# Patient Record
Sex: MALE | Race: WHITE | URBAN - METROPOLITAN AREA
[De-identification: names, ages, dates, MRNs, and addresses within clinical notes are randomized per-mention and may not be internally consistent; named-entity substitution may affect disease eponyms.]

---

## 2020-10-16 LAB — HBA1C MFR BLD HPLC: 10 %

## 2020-12-14 ENCOUNTER — VIRTUAL VISIT (OUTPATIENT)
Dept: ENDOCRINOLOGY | Age: 55
End: 2020-12-14
Payer: COMMERCIAL

## 2020-12-14 DIAGNOSIS — E11.65 TYPE 2 DIABETES MELLITUS WITH HYPERGLYCEMIA, WITH LONG-TERM CURRENT USE OF INSULIN (HCC): Primary | ICD-10-CM

## 2020-12-14 DIAGNOSIS — Z79.4 TYPE 2 DIABETES MELLITUS WITH HYPERGLYCEMIA, WITH LONG-TERM CURRENT USE OF INSULIN (HCC): Primary | ICD-10-CM

## 2020-12-14 PROCEDURE — 99203 OFFICE O/P NEW LOW 30 MIN: CPT | Performed by: INTERNAL MEDICINE

## 2020-12-14 RX ORDER — LANOLIN ALCOHOL/MO/W.PET/CERES
400 CREAM (GRAM) TOPICAL DAILY
COMMUNITY

## 2020-12-14 RX ORDER — PHENOL/SODIUM PHENOLATE
1 AEROSOL, SPRAY (ML) MUCOUS MEMBRANE DAILY
COMMUNITY
End: 2022-04-11 | Stop reason: SDUPTHER

## 2020-12-14 RX ORDER — LISINOPRIL 20 MG/1
20 TABLET ORAL DAILY
COMMUNITY
Start: 2020-07-14 | End: 2021-06-02

## 2020-12-14 RX ORDER — METFORMIN HYDROCHLORIDE 1000 MG/1
TABLET ORAL
COMMUNITY
Start: 2020-08-26 | End: 2021-05-18

## 2020-12-14 RX ORDER — INSULIN LISPRO 100 [IU]/ML
INJECTION, SOLUTION INTRAVENOUS; SUBCUTANEOUS
COMMUNITY
Start: 2020-10-16 | End: 2021-06-29

## 2020-12-14 RX ORDER — SIMVASTATIN 40 MG/1
40 TABLET, FILM COATED ORAL DAILY
COMMUNITY
Start: 2020-08-26 | End: 2021-05-18

## 2020-12-14 RX ORDER — LORATADINE 10 MG/1
10 TABLET ORAL DAILY
COMMUNITY

## 2020-12-14 RX ORDER — SERTRALINE HYDROCHLORIDE 100 MG/1
TABLET, FILM COATED ORAL
COMMUNITY
Start: 2020-09-22 | End: 2021-02-25 | Stop reason: DRUGHIGH

## 2020-12-14 RX ORDER — LEVOTHYROXINE SODIUM 25 UG/1
1 TABLET ORAL
COMMUNITY
Start: 2020-11-28 | End: 2021-05-18

## 2020-12-14 RX ORDER — GLIPIZIDE 10 MG/1
TABLET ORAL
COMMUNITY
Start: 2020-09-22 | End: 2021-03-19

## 2020-12-14 RX ORDER — TRAZODONE HYDROCHLORIDE 50 MG/1
TABLET ORAL
COMMUNITY
Start: 2020-09-22 | End: 2021-03-19

## 2020-12-14 NOTE — PROGRESS NOTES
This is a new pt visit conducted via telemedicine using [employer] video. The patient has been instructed that this meets HIPAA criteria ,that they may receive a bill for these services and acknowledges and agrees to this method of visitation. This is a 53-year-old white male with a history of type 2 diabetes mellitus diagnosed in 1999. Strong family history of diabetes. His father and his paternal grandfather have or had diabetes. He was found to have diabetes on a routine physical.  He was placed on metformin and glipizide and over the last 20 years the regimen has been changed to add insulin. He says his most recent A1c was 10.1%. The diabetes is complicated by retinopathy and is currently receiving injections into the right. Current Diabetes Medication  Metformin 1000 BID  Glipizide 10 mg BID  70/30 insulin 50 AM and 35 units PM  Humalog 5 units with lunch    He says he wakes up in the morning with blood sugars of about 130. This apparently is an improvement. He says his blood sugars at bedtime are in the 1  range. He works as a  and lives with his mother. Gonzalokendrick Cogan He is working from home. Breakfast is cereal or eggs and toast and grape juice and/or milk. Lunch is typically a sandwich or a [place] taco salad. He drinks a diet Pepsi. Dinner can be a [place] salad or hamburger or shrimp and broccoli or stirfry. Bedtime is usually a rare cookie or cupcake. His exercise is minimal.  He occasionally walks for 20 to 60 minutes. He said he sleeps poorly. He has nocturia x1.    Review of Systems - General ROS: negative  Psychological ROS: negative  Ophthalmic ROS: positive for - blurry vision and retinopathy in the right  ENT ROS: negative  Respiratory ROS: no cough, shortness of breath, or wheezing  Cardiovascular ROS: no chest pain or dyspnea on exertion  Gastrointestinal ROS: no abdominal pain, change in bowel habits, or black or bloody stools  Genito-Urinary ROS: no dysuria, trouble voiding, or hematuria  Musculoskeletal ROS: negative  Neurological ROS: no TIA or stroke symptoms  Dermatological ROS: negative    GENERAL: NCAT, Appears well nourished  Weight 314 pounds  Height 6 feet 1 inch  BMI 41 kg/m²  EYES: EOMI, non-icteric, no proptosis   Ear/Nose/Throat: NCAT, no visible inflammation or masses   CARDIOVASCULAR: no cyanosis, no visible JVD   RESPIRATORY: comfortable respirations observed, no cyanosis   MUSCULOSKELETAL: Normal ROM of upper extremities observed   SKIN: No edema, rash, or other significant changes observed   NEUROLOGIC:  AAOx3   PSYCHIATRIC: Normal affect, Normal insight and judgement       Impression type 2 diabetes mellitus and morbid obesity with a recent A1c of 10.1% on combination therapy. Plan:  1. We will continue the current regimen for now  2. After considerable discussion we elected to begin Ozempic 0.25 mg to be advanced as tolerated  3. He will continue to monitor his blood sugars and send me the report. 4.  I will see him back in 3 months, hopefully face-to-face.     ADDENDUM: received recent labwork from PCP October 2020  Weight 318  HbA1c 10.0  TSH 3.2  Creat 0.6  Glucose 372

## 2021-02-25 ENCOUNTER — OFFICE VISIT (OUTPATIENT)
Dept: INTERNAL MEDICINE CLINIC | Age: 56
End: 2021-02-25
Payer: COMMERCIAL

## 2021-02-25 VITALS
HEIGHT: 73 IN | SYSTOLIC BLOOD PRESSURE: 130 MMHG | DIASTOLIC BLOOD PRESSURE: 72 MMHG | TEMPERATURE: 97.8 F | BODY MASS INDEX: 41.75 KG/M2 | WEIGHT: 315 LBS | HEART RATE: 96 BPM | RESPIRATION RATE: 18 BRPM | OXYGEN SATURATION: 97 %

## 2021-02-25 DIAGNOSIS — N40.1 BENIGN PROSTATIC HYPERPLASIA WITH URINARY HESITANCY: ICD-10-CM

## 2021-02-25 DIAGNOSIS — N62 GYNECOMASTIA, MALE: ICD-10-CM

## 2021-02-25 DIAGNOSIS — E66.01 MORBID OBESITY WITH BMI OF 40.0-44.9, ADULT (HCC): ICD-10-CM

## 2021-02-25 DIAGNOSIS — E78.00 HYPERCHOLESTEROLEMIA: ICD-10-CM

## 2021-02-25 DIAGNOSIS — Z11.59 NEED FOR HEPATITIS C SCREENING TEST: ICD-10-CM

## 2021-02-25 DIAGNOSIS — F33.1 MODERATE EPISODE OF RECURRENT MAJOR DEPRESSIVE DISORDER (HCC): ICD-10-CM

## 2021-02-25 DIAGNOSIS — I10 ESSENTIAL HYPERTENSION: ICD-10-CM

## 2021-02-25 DIAGNOSIS — E55.9 VITAMIN D DEFICIENCY: ICD-10-CM

## 2021-02-25 DIAGNOSIS — Z00.00 WELLNESS EXAMINATION: ICD-10-CM

## 2021-02-25 DIAGNOSIS — Z79.4 TYPE 2 DIABETES MELLITUS WITH RETINOPATHY WITHOUT MACULAR EDEMA, WITH LONG-TERM CURRENT USE OF INSULIN, UNSPECIFIED LATERALITY, UNSPECIFIED RETINOPATHY SEVERITY (HCC): Primary | ICD-10-CM

## 2021-02-25 DIAGNOSIS — E03.9 ACQUIRED HYPOTHYROIDISM: ICD-10-CM

## 2021-02-25 DIAGNOSIS — R39.11 BENIGN PROSTATIC HYPERPLASIA WITH URINARY HESITANCY: ICD-10-CM

## 2021-02-25 DIAGNOSIS — E11.319 TYPE 2 DIABETES MELLITUS WITH RETINOPATHY WITHOUT MACULAR EDEMA, WITH LONG-TERM CURRENT USE OF INSULIN, UNSPECIFIED LATERALITY, UNSPECIFIED RETINOPATHY SEVERITY (HCC): Primary | ICD-10-CM

## 2021-02-25 DIAGNOSIS — E29.1 HYPOGONADISM IN MALE: ICD-10-CM

## 2021-02-25 DIAGNOSIS — Z12.11 COLON CANCER SCREENING: ICD-10-CM

## 2021-02-25 LAB
25(OH)D3 SERPL-MCNC: 40 NG/ML (ref 30–96)
A-G RATIO,AGRAT: 2 RATIO
ALBUMIN SERPL-MCNC: 4.5 G/DL (ref 3.9–5.4)
ALP SERPL-CCNC: 116 U/L (ref 38–126)
ALT SERPL-CCNC: 31 U/L (ref 0–50)
ANION GAP SERPL CALC-SCNC: 15 MMOL/L
AST SERPL W P-5'-P-CCNC: 23 U/L (ref 14–36)
BILIRUB SERPL-MCNC: 0.4 MG/DL (ref 0.2–1.3)
BILIRUB UR QL: NEGATIVE
BUN SERPL-MCNC: 21 MG/DL (ref 9–20)
BUN/CREATININE RATIO,BUCR: 30 RATIO
CALCIUM SERPL-MCNC: 10.2 MG/DL (ref 8.4–10.2)
CHLORIDE SERPL-SCNC: 101 MMOL/L (ref 98–107)
CHOL/HDL RATIO,CHHD: 4 RATIO (ref 0–4)
CHOLEST SERPL-MCNC: 154 MG/DL (ref 0–200)
CLARITY: CLEAR
CO2 SERPL-SCNC: 23 MMOL/L (ref 22–32)
COLOR UR: NORMAL
CREAT SERPL-MCNC: 0.7 MG/DL (ref 0.8–1.5)
ERYTHROCYTE [DISTWIDTH] IN BLOOD BY AUTOMATED COUNT: 15.3 %
GLOBULIN,GLOB: 2.3
GLUCOSE 24H UR-MRATE: NEGATIVE G/(24.H)
GLUCOSE SERPL-MCNC: 179 MG/DL (ref 75–110)
HBA1C MFR BLD HPLC: 6.8 % (ref 4–5.7)
HCT VFR BLD AUTO: 42 % (ref 37–51)
HDLC SERPL-MCNC: 41 MG/DL (ref 35–130)
HGB BLD-MCNC: 13.6 G/DL (ref 12–18)
HGB UR QL STRIP: NEGATIVE
KETONES UR QL STRIP.AUTO: NEGATIVE
LDL/HDL RATIO,LDHD: 1 RATIO
LDLC SERPL CALC-MCNC: 55 MG/DL (ref 0–130)
LEUKOCYTE ESTERASE: NEGATIVE
LYMPHOCYTES ABSOLUTE: 2 K/UL (ref 0.6–4.1)
LYMPHOCYTES NFR BLD: 21.8 % (ref 10–58.5)
MCH RBC QN AUTO: 28.8 PG (ref 26–32)
MCHC RBC AUTO-ENTMCNC: 32.4 G/DL (ref 30–36)
MCV RBC AUTO: 88.8 FL (ref 80–97)
MICROALBUMIN, URINE: 20 MG/L (ref 0–20)
MONOCYTES ABS-DIF,2141: 0.5 K/UL (ref 0–1.8)
MONOCYTES NFR BLD: 5.6 % (ref 0.1–24)
NEUTROPHILS # BLD: 72.6 % (ref 37–92)
NEUTROPHILS ABS,2156: 6.7 K/UL (ref 2–7.8)
NITRITE UR QL STRIP.AUTO: NEGATIVE
PH UR STRIP: 5 [PH] (ref 5–7)
PLATELET # BLD AUTO: 191 K/UL (ref 140–440)
POTASSIUM SERPL-SCNC: 4.4 MMOL/L (ref 3.6–5)
PROT SERPL-MCNC: 6.8 G/DL (ref 6.3–8.2)
PROT UR STRIP-MCNC: NEGATIVE MG/DL
PSA, TEST22: 0.7 NG/ML (ref 0–4)
RBC # BLD AUTO: 4.73 M/UL (ref 4.2–6.3)
RBC #/AREA URNS HPF: 0 #/HPF
SODIUM SERPL-SCNC: 139 MMOL/L (ref 137–145)
SP GR UR REFRACTOMETRY: 1.03 (ref 1–1.03)
TRIGL SERPL-MCNC: 288 MG/DL (ref 0–200)
TSH SERPL DL<=0.05 MIU/L-ACNC: 3.5 UIU/ML (ref 0.34–5.6)
UROBILINOGEN UR QL STRIP.AUTO: NEGATIVE
VLDLC SERPL CALC-MCNC: 58 MG/DL
WBC # BLD AUTO: 9.2 K/UL (ref 4.1–10.9)
WBC URNS QL MICRO: 0 #/HPF

## 2021-02-25 PROCEDURE — 99215 OFFICE O/P EST HI 40 MIN: CPT | Performed by: NURSE PRACTITIONER

## 2021-02-25 PROCEDURE — G0103 PSA SCREENING: HCPCS | Performed by: NURSE PRACTITIONER

## 2021-02-25 PROCEDURE — 81003 URINALYSIS AUTO W/O SCOPE: CPT | Performed by: NURSE PRACTITIONER

## 2021-02-25 PROCEDURE — 85025 COMPLETE CBC W/AUTO DIFF WBC: CPT | Performed by: NURSE PRACTITIONER

## 2021-02-25 PROCEDURE — 80061 LIPID PANEL: CPT | Performed by: NURSE PRACTITIONER

## 2021-02-25 PROCEDURE — 80053 COMPREHEN METABOLIC PANEL: CPT | Performed by: NURSE PRACTITIONER

## 2021-02-25 PROCEDURE — 82044 UR ALBUMIN SEMIQUANTITATIVE: CPT | Performed by: NURSE PRACTITIONER

## 2021-02-25 PROCEDURE — 82306 VITAMIN D 25 HYDROXY: CPT | Performed by: NURSE PRACTITIONER

## 2021-02-25 PROCEDURE — 93000 ELECTROCARDIOGRAM COMPLETE: CPT | Performed by: NURSE PRACTITIONER

## 2021-02-25 PROCEDURE — 83036 HEMOGLOBIN GLYCOSYLATED A1C: CPT | Performed by: NURSE PRACTITIONER

## 2021-02-25 PROCEDURE — 84443 ASSAY THYROID STIM HORMONE: CPT | Performed by: NURSE PRACTITIONER

## 2021-02-25 RX ORDER — SERTRALINE HYDROCHLORIDE 100 MG/1
100 TABLET, FILM COATED ORAL 2 TIMES DAILY
Qty: 180 TAB | Refills: 1 | Status: SHIPPED | OUTPATIENT
Start: 2021-02-25 | End: 2022-02-18

## 2021-02-25 RX ORDER — TAMSULOSIN HYDROCHLORIDE 0.4 MG/1
0.4 CAPSULE ORAL
Qty: 90 CAP | Refills: 1 | Status: SHIPPED | OUTPATIENT
Start: 2021-02-25 | End: 2021-09-13 | Stop reason: SDUPTHER

## 2021-02-25 RX ORDER — TESTOSTERONE CYPIONATE 100 MG/ML
200 INJECTION, SOLUTION INTRAMUSCULAR
Qty: 2 ML | Refills: 5 | Status: SHIPPED | OUTPATIENT
Start: 2021-02-25 | End: 2021-03-02 | Stop reason: CLARIF

## 2021-02-25 NOTE — PROGRESS NOTES
Charity Huang is a 50 y.o. male     Chief Complaint   Patient presents with   [address] [place] [address]     new patient       Visit Vitals  /72 (BP 1 Location: Left upper arm, BP Patient Position: Sitting, BP Cuff Size: Large adult)   Pulse 96   Temp 97.8 °F (36.6 °C) (Temporal)   Resp 18   Ht 6' 1\" (1.854 m)   Wt 315 lb 3.2 oz (143 kg)   SpO2 97%   BMI 41.59 kg/m²       Health Maintenance Due   Topic Date Due    Hepatitis C Screening  1965    Foot Exam Q1  12/08/1977    MICROALBUMIN Q1  12/08/1977    Eye Exam Retinal or Dilated  12/08/1977    Lipid Screen  12/08/1977    COVID-19 Vaccine (1 of 2) 12/08/1983    DTaP/Tdap/Td series (1 - Tdap) 12/08/1988    Shingrix Vaccine Age 50> (1 of 2) 12/08/2017    Colorectal Cancer Screening Combo  12/08/2017    A1C test (Diabetic or Prediabetic)  01/16/2021       1. Have you been to the ER, urgent care clinic since your last visit? Hospitalized since your last visit? No    2. Have you seen or consulted any other health care providers outside of the [address] [place] [address] since your last visit? Include any pap smears or colon screening.  No

## 2021-02-25 NOTE — PROGRESS NOTES
Establish Care (new patient)       HPI:     Elayne Kahn is a 50y.o. year old male who is here to establish as a new patient, and to discuss current medical concerns and clinical history. Patient states he has been treated for a variety of conditions including: Diabetes type 2 with insulin and oral hypoglycemics, hyperlipidemia, hypothyroidism, GERD, hypogonadism, hypertension, and obesity. The patient is currently being seen by endocrinology for management of his diabetes. He has had no major medication changes in the past couple of months except for the addition of Ozempic. He checks his blood sugars on a regular basis. He states his blood sugars are typically in the 120s to 140s in the a.m., and 150s to 170s in the p.m. The patient states he is also being treated for depression. He has been on Zoloft at 150 mg daily for quite some time now. However, he believes his depression has grown worse over the past couple of months. He has been very compliant with his medication, and denies any adverse side effects. He is currently being managed for hypertension, and is compliant with current medications as listed. He denies any adverse side effects of medication(s) at this time, and states He is tolerating them well. The patient denies any symptoms of chest pain, shortness of breath, dizziness, orthostasis, or palpitations. He is not measuring blood pressure on a regular basis. He is currently being managed for hyperlipidemia, and is compliant with current medication(s) as listed. He denies any adverse side effects of medication(s) at this time, and states He is tolerating them well. The patient denies any symptoms of muscle or joint pain, weakness, change in bowel patterns, abdominal pain, or headaches. He is not always compliant with a low fat/low cholesterol diet at this time, and is not purposefully exercising on a regular basis. However, he states he bowls weekly, and walks occasionally.     The patient also states a history of hypogonadism. It was recommended that he start testosterone replacement injections a couple of years ago, but states it was financially too expensive for him, and never started. Since that time however, he states he has had a change in his insurance. The patient also complains of increased urinary dribbling, and slow flow. He states this is been gradually worsening over the past year or so. He has never tried medication to treat this. Visit Vitals  /72 (BP 1 Location: Left upper arm, BP Patient Position: Sitting, BP Cuff Size: Large adult)   Pulse 96   Temp 97.8 °F (36.6 °C) (Temporal)   Resp 18   Ht 6' 1\" (1.854 m)   Wt 315 lb 3.2 oz (143 kg)   SpO2 97%   BMI 41.59 kg/m²         Prior to Admission medications    Medication Sig Start Date End Date Taking? Authorizing Provider   calcium citrate-vitamin D3 (CITRACAL WITH VITAMIN D MAXIMUM) tablet Take 1 Tab by mouth daily. Yes Provider, Historical   docosahexanoic acid-eicosapent 120-180 mg cap Take 2 Caps by mouth daily. Yes Provider, Historical   glipiZIDE (GLUCOTROL) 10 mg tablet Take 1 tablet by mouth twice daily. 9/22/20  Yes Provider, Historical   insulin lispro (HUMALOG) 100 unit/mL kwikpen ADM 5 UNI SC B LUNCH 10/16/20  Yes Provider, Historical   insulin NPH/insulin regular (NOVOLIN 70/30, HUMULIN 70/30) 100 unit/mL (70-30) injection Inject 50u in the am and 35u in pm 4/23/20  Yes Provider, Historical   levothyroxine (SYNTHROID) 25 mcg tablet Take 1 Tab by mouth Daily (before breakfast). 11/28/20  Yes Provider, Historical   lisinopriL (PRINIVIL, ZESTRIL) 20 mg tablet Take 20 mg by mouth daily. 7/14/20  Yes Provider, Historical   loratadine (CLARITIN) 10 mg tablet Take 10 mg by mouth daily. Yes Provider, Historical   magnesium oxide (MAG-OX) 400 mg tablet Take 500 mg by mouth daily. Yes Provider, Historical   metFORMIN (GLUCOPHAGE) 1,000 mg tablet Take 1 tablet by mouth twice daily.  8/26/20 Yes Provider, Historical   Omeprazole delayed release (PRILOSEC D/R) 20 mg tablet Take 1 Tab by mouth daily. Yes Provider, Historical   sertraline (ZOLOFT) 100 mg tablet Take 1 and 1/2 tablets by mouth daily. 9/22/20  Yes Provider, Historical   simvastatin (ZOCOR) 40 mg tablet Take 40 mg by mouth daily. 8/26/20  Yes Provider, Historical   traZODone (DESYREL) 50 mg tablet Take 1 tablet by mouth at bedtime. 9/22/20  Yes Provider, Historical   multivit-min/FA/lycopen/lutein (CENTRUM SILVER MEN PO) Take 1 Tab by mouth daily.    Yes Provider, Historical   semaglutide (OZEMPIC) 0.25 mg/0.2 mL (2 mg/1.5 mL) sub-q pen 0.25 mg weekly for 4 weeks and then increase to 0.5 mg weekly 12/14/20  Yes Marisol Carvalho MD        No Known Allergies     Social History     Socioeconomic History    Marital status: SINGLE     Spouse name: Not on file    Number of children: Not on file    Years of education: Not on file    Highest education level: Not on file   Occupational History    Not on file   Social Needs    Financial resource strain: Not on file    Food insecurity     Worry: Not on file     Inability: Not on file    Transportation needs     Medical: Not on file     Non-medical: Not on file   Tobacco Use    Smoking status: Never Smoker    Smokeless tobacco: Never Used   Substance and Sexual Activity    Alcohol use: Not on file    Drug use: Not on file    Sexual activity: Not on file   Lifestyle    Physical activity     Days per week: Not on file     Minutes per session: Not on file    Stress: Not on file   Relationships    Social connections     Talks on phone: Not on file     Gets together: Not on file     Attends L[employer]-day service: Not on file     Active member of club or organization: Not on file     Attends meetings of clubs or organizations: Not on file     Relationship status: Not on file    Intimate partner violence     Fear of current or ex partner: Not on file     Emotionally abused: Not on file     Physically abused: Not on file     Forced sexual activity: Not on file   Other Topics Concern    Not on file   Social History Narrative    Not on file        ROS:     Constitutional: He denies fevers, unintentional weight loss, or night sweats. Eyes: No blurred or double vision. Gets eyes checked regularly by ophthalmology. Positive for history of retinal issues secondary to diabetes. ENT: No difficulty with swallowing, taste, speech or smell. Respiratory: No cough wheezing or shortness of breath. Cardiovascular: Denies chest pain, palpitations, unexplained indigestion or syncope. Gastrointestinal:  No changes in bowel movements, no abdominal pain, no bloating. Genitourinary: See HPI. Acute extremities: No joint pain, stiffness or swelling. Neurological:  No numbness, tingling, burring paresthesias or loss of motor strength. No syncope, dizziness or frequent headache  Skin:  No recent rashes or mole changes. Psychiatric/Behavioral: Positive for depression. Hematologic: no easy bruising or bleeding gums  Lymphatic: no lymph node enlargement or night sweats  Endocrine: no increased urination or increased thirst, w/o rapid weight change and no night sweats      Physical Examination:     Vitals:    02/25/21 1306   BP: 130/72   Pulse: 96   Resp: 18   Temp: 97.8 °F (36.6 °C)   TempSrc: Temporal   SpO2: 97%   Weight: 315 lb 3.2 oz (143 kg)   Height: 6' 1\" (1.854 m)   PainSc:   0 - No pain        General appearance - alert, well appearing, and in no distress  Mental status - alert, oriented to person, place, and time  HEENT:  Ears - bilateral TM's and external ear canals clear  Eyes - pupillary responses were normal.  Extraocular muscle function intact. Lids and conjunctiva not injected. Fundoscopic exam revealed sharp disc margins. eye movements intact  Pharynx- clear with teeth in good repair. No masses were noted  Neck - supple without thyromegaly or burit.   No JVD noted  Lungs - clear to auscultation and percussion  Cardiac- normal rate, regular rhythm without murmurs. PMI not displaced. No gallop, rub or click  Abdomen -rounded, soft, non-tender without palpable organomegaly or mass. No pulsatile mass was felt, and not bruit was heard. Bowel sounds were active  Extremities -  no clubbing cyanosis or edema  Lymphatics - no palpable lymphadenopathy, no hepatosplenomegaly  Peripheral vascular - Femoral, Dorsalis pedis and posterior tibial pulses felt without difficulty  Skin - no rash or unusual mole change noted  Neurological - Cranial nerves II-XII grossly intact. Motor strength 5/5. DTR's 2+ and symmetric. Station and gait normal  Psychiatric: Patient pleasant and cooperative. Somewhat blunted affect. Appears mildly depressed. Diabetic Foot Exam: Appearance: NO Claw toe. INTACT midfoot arch. NO abnormal bony prominences. NO muscle wasting. NORMAL distribution of weight when standing. Vascular: DP and PT pulses 2+, symmetric   Sensation: INTACT vibration, pressure, pinprick   Signs of Infection: NO erythema, warmth, tenderness, swelling, purulence    EKG: EKG performed in office today and interpreted by me. Patient has no significant ST elevation/depression, and no T wave ischemia. Otherwise unremarkable EKG. Assessment/Plan:       ICD-10-CM ICD-9-CM    1. Type 2 diabetes mellitus with retinopathy without macular edema, with long-term current use of insulin, unspecified laterality, unspecified retinopathy severity ([facility] [address]  E11.319 250.50  DIABETES FOOT EXAM    Z79.4 362.01 HEMOGLOBIN A1C W/O EAG     V58.67 LIPID PANEL      METABOLIC PANEL, COMPREHENSIVE      URINE, MICROALBUMIN, SEMIQUANTITATIVE   2. Moderate episode of recurrent major depressive disorder (HCC)  F33.1 296.32 sertraline (ZOLOFT) 100 mg tablet   3. Essential hypertension  A37 994.1 METABOLIC PANEL, COMPREHENSIVE      URINE, MICROALBUMIN, SEMIQUANTITATIVE      AMB POC EKG ROUTINE W/ 12 LEADS, INTER & REP   4. Hypercholesterolemia  E78.00 272.0 LIPID PANEL   5. Morbid obesity with BMI of 40.0-44.9, adult (HCC)  E66.01 278.01     Z68.41 V85.41    6. Wellness examination  Z00.00 V70.0 CBC WITH AUTOMATED DIFF      URINALYSIS W/MICROSCOPIC   7. Need for hepatitis C screening test  Z11.59 V73.89 HEPATITIS C AB   8. Hypogonadism in male  E29.1 257.2 TESTOSTERONE, FREE & TOTAL      testosterone cypionate (Depo-Testosterone) 100 mg/mL injection   9. Gynecomastia, male  N62 611.1 TESTOSTERONE, FREE & TOTAL      testosterone cypionate (Depo-Testosterone) 100 mg/mL injection   10. Benign prostatic hyperplasia with urinary hesitancy  N40.1 600.01 PSA SCREENING (SCREENING)    R39.11 788.64 tamsulosin (Flomax) 0.4 mg capsule   11. Vitamin D deficiency  E55.9 268.9 VITAMIN D, 25 HYDROXY   12. Acquired hypothyroidism  E03.9 244.9 TSH 3RD GENERATION   13. Colon cancer screening  Z12.11 V76.51 REFERRAL FOR COLONOSCOPY     1: We will do baseline labs today including: CBC, CMP, lipid panel, PSA, A1c, hepatitis C, TSH, vitamin D, urinalysis, and microalbumin. 2: Due to slightly worsening depression, I will increase the patient's Zoloft to 100 mg twice a day. 3: Patient is symptomatic of BPH, I will start him on Flomax 0.4 mg daily at dinner. 4: Patient will be started on Depo testosterone monthly at 200 mg. This can be administered through pharmacy. 5: Patient to work on healthy lifestyle management including low-fat/low-cholesterol diet, low sugar/simple carbohydrate intake, increase fiber, and increase regular exercise as tolerated. 6: Referred to GI for needed screening colonoscopy. 7: Patient to follow-up with me in approximately 1 month, or sooner as needed. Patient states understanding and agrees with plan. I have reviewed the patient's medical history in detail and updated the computerized patient record.      We had a prolonged discussion about these complex clinical issues and went over the various important aspects to consider. All questions were answered. Advised him to call back or return to office if symptoms do not improve, change in nature, or persist.    He was given an after visit summary or informed of [employer] [facility] which includes patient instructions, diagnoses, current medications, & vitals. He expressed understanding with the diagnosis and plan. Signed,  Linaet Thorne.  Dale Nguyen MSN APRN FNP-BC

## 2021-02-28 LAB
HCV AB S/CO SERPL IA: <0.1 S/CO RATIO (ref 0–0.9)
TESTOST FREE SERPL-MCNC: 2 PG/ML (ref 7.2–24)
TESTOST SERPL-MCNC: 139 NG/DL (ref 264–916)

## 2021-03-02 RX ORDER — TESTOSTERONE CYPIONATE 200 MG/ML
200 INJECTION INTRAMUSCULAR
Qty: 1 ML | Refills: 5 | Status: SHIPPED | OUTPATIENT
Start: 2021-03-02 | End: 2021-09-09 | Stop reason: SDUPTHER

## 2021-03-02 NOTE — PROGRESS NOTES
Called patient and informed him of labs. He said his testosterone prior authorization got rejected and his [facility] pharmacy would give us a call to approve a different dosage. (200 mg per ml)? Patient would like to know if his [facility] pharmacy reached out to us about this yet?

## 2021-03-02 NOTE — PROGRESS NOTES
Urinalysis is normal.  Microalbumin is in good shape showing no signs of damage to kidneys presently. Hemoglobin A1c is 6.8% which is in good standing with diabetes. Cholesterol levels look fairly good overall. Kidney function and liver functions are okay. He appeared to be slightly dehydrated. Drink a bit more water. Blood count shows no signs of anemia. Thyroid level is normal.    Vitamin D level and PSA are normal.  Hepatitis C is negative. Testosterone levels appear quite low. Start replacement therapy as discussed. Repeat labs in 6 months.

## 2021-03-19 RX ORDER — GLIPIZIDE 10 MG/1
TABLET ORAL
Qty: 180 TAB | Refills: 0 | Status: SHIPPED | OUTPATIENT
Start: 2021-03-19 | End: 2021-06-02

## 2021-03-19 RX ORDER — TRAZODONE HYDROCHLORIDE 50 MG/1
TABLET ORAL
Qty: 90 TAB | Refills: 0 | Status: SHIPPED | OUTPATIENT
Start: 2021-03-19 | End: 2021-06-02

## 2021-03-22 ENCOUNTER — VIRTUAL VISIT (OUTPATIENT)
Dept: ENDOCRINOLOGY | Age: 56
End: 2021-03-22
Payer: COMMERCIAL

## 2021-03-22 DIAGNOSIS — E03.9 ACQUIRED HYPOTHYROIDISM: ICD-10-CM

## 2021-03-22 DIAGNOSIS — E29.1 HYPOGONADISM IN MALE: ICD-10-CM

## 2021-03-22 DIAGNOSIS — E11.65 TYPE 2 DIABETES MELLITUS WITH HYPERGLYCEMIA, WITH LONG-TERM CURRENT USE OF INSULIN (HCC): Primary | ICD-10-CM

## 2021-03-22 DIAGNOSIS — Z79.4 TYPE 2 DIABETES MELLITUS WITH HYPERGLYCEMIA, WITH LONG-TERM CURRENT USE OF INSULIN (HCC): Primary | ICD-10-CM

## 2021-03-22 PROCEDURE — 99214 OFFICE O/P EST MOD 30 MIN: CPT | Performed by: INTERNAL MEDICINE

## 2021-03-22 NOTE — PROGRESS NOTES
This is an established visit conducted via telemedicine using [employer] video. The patient has been instructed that this meets HIPAA criteria ,that they may receive a bill for these services and acknowledges and agrees to this method of visitation.     This is a 21-year-old white male with a history of type 2 diabetes mellitus diagnosed in 1999. Strong family history of diabetes. His father and his paternal grandfather have or had diabetes. He was found to have diabetes on a routine physical.  He was placed on metformin and glipizide and over the last 20 years the regimen has been changed to add insulin. He says his most recent A1c was 10.1%. The diabetes is complicated by retinopathy and is currently receiving injections into the right. At my first visit, I added Ozempic which was titrated up to 0.5 mg weekly which he continues to take. He has a recent A1c of 6.8%. Current Diabetes Medication  Metformin 1000 BID  Glipizide 10 mg BID  70/30 insulin 50 AM and 35 units PM  Humalog 5 units with lunch  Ozempic 0.5 mg weekly     He says he wakes up in the morning with blood sugars of . When I saw him for the first time his bedtime blood sugars range from 180-200. His blood sugars now range between 100-130 at bedtime. He works as a  and lives with his mother. He is working from home.     Breakfast is cereal or eggs and toast and grape juice and/or milk. Lunch is typically a sandwich or a [place] taco salad. He drinks a diet Pepsi. Dinner can be a [place] salad or hamburger or shrimp and broccoli or stirfry. Bedtime is usually a rare cookie or cupcake. He occasionally walks for 20 to 60 minutes. He does tell me that since starting the Ozempic, he has had decreased appetite and is lost about 7 or 8 pounds. He has some symptoms of hypoglycemia when his blood sugar gets into the high 80s but he said no episodes of low blood sugar less than 70.     Examination  GENERAL: NCAT, Appears well nourished   EYES: EOMI, non-icteric, no proptosis   Ear/Nose/Throat: NCAT, no visible inflammation or masses   CARDIOVASCULAR: no cyanosis, no visible JVD   RESPIRATORY: comfortable respirations observed, no cyanosis   MUSCULOSKELETAL: Normal ROM of upper extremities observed   SKIN: No edema, rash, or other significant changes observed   NEUROLOGIC:  AAOx3   PSYCHIATRIC: Normal affect, Normal insight and judgement       Impression  1. Type 2 diabetes mellitus with improving glucose control. 2.  Morbid obesity with some weight loss    Plan:  1. I have decreased the 70/30 insulin to 40 5 in the morning and 30 in the evening  2. I eliminated the 5 units of Humalog at lunch  3.  I have continued the Metformin, glipizide and Ozempic. 4.  We did discuss going up on the [address] [facility] but he would prefer to stay here for now. 5.  I will see him back in 3 months face-to-face.

## 2021-03-26 ENCOUNTER — OFFICE VISIT (OUTPATIENT)
Dept: INTERNAL MEDICINE CLINIC | Age: 56
End: 2021-03-26
Payer: COMMERCIAL

## 2021-03-26 VITALS
OXYGEN SATURATION: 99 % | WEIGHT: 315 LBS | BODY MASS INDEX: 41.75 KG/M2 | SYSTOLIC BLOOD PRESSURE: 127 MMHG | DIASTOLIC BLOOD PRESSURE: 83 MMHG | HEART RATE: 83 BPM | RESPIRATION RATE: 16 BRPM | TEMPERATURE: 97.9 F | HEIGHT: 73 IN

## 2021-03-26 DIAGNOSIS — E29.1 HYPOGONADISM IN MALE: ICD-10-CM

## 2021-03-26 DIAGNOSIS — N40.1 BENIGN PROSTATIC HYPERPLASIA WITH URINARY HESITANCY: ICD-10-CM

## 2021-03-26 DIAGNOSIS — R39.11 BENIGN PROSTATIC HYPERPLASIA WITH URINARY HESITANCY: ICD-10-CM

## 2021-03-26 DIAGNOSIS — F33.1 MODERATE EPISODE OF RECURRENT MAJOR DEPRESSIVE DISORDER (HCC): Primary | ICD-10-CM

## 2021-03-26 PROCEDURE — 99213 OFFICE O/P EST LOW 20 MIN: CPT | Performed by: NURSE PRACTITIONER

## 2021-03-26 NOTE — PROGRESS NOTES
Jose A Wu is a 50 y.o. male    Chief Complaint   Patient presents with    Diabetes     4 wk follow up    Blood Pressure Check    Medication Evaluation       Visit Vitals  /83 (BP 1 Location: Left upper arm, BP Patient Position: Sitting, BP Cuff Size: Large adult)   Pulse 83   Temp 97.9 °F (36.6 °C)   Resp 16   Ht 6' 1\" (1.854 m)   Wt 318 lb (144.2 kg)   SpO2 99%   BMI 41.96 kg/m²           1. Have you been to the ER, urgent care clinic since your last visit? Hospitalized since your last visit? No     2. Have you seen or consulted any other health care providers outside of the [address] [place] [address] since your last visit? Include any pap smears or colon screening.  No

## 2021-03-26 NOTE — PROGRESS NOTES
Chief Complaint   Patient presents with    Diabetes     4 wk follow up    Blood Pressure Check    Medication Evaluation       SUBJECTIVE:    Evert Felix is a 50 y.o. male who is here today for a follow up appointment regarding his depression and benign prostatic hypertrophy. At our last encounter, the patient had an increase in his Zoloft to 100 mg twice daily for management of his depression. However, he states he has not started this due to his reliance on \"pill packs,\" which are prefilled packages of medication. He states it will be incorporated in his next allotment that should be available soon. The patient started his tamsulosin 0.4 mg daily for management of his BPH. However, he states he has not noticed a significant difference quite yet regarding the medication. He continues to follow-up with endocrinology regarding his diabetes at this time. His last hemoglobin A1c was 6.8%. He states his Humulin 70/30 had been reduced to 45 units each morning, and 30 units each evening. He states he is doing some limited exercising including bowling 1 time a week, and walks a couple of times a week. Current Outpatient Medications   Medication Sig Dispense Refill    glipiZIDE (GLUCOTROL) 10 mg tablet Take 1 tablet by mouth twice daily. 180 Tab 0    traZODone (DESYREL) 50 mg tablet Take 1 tablet by mouth at bedtime. 90 Tab 0    testosterone cypionate (DEPOTESTOTERONE CYPIONATE) 200 mg/mL injection 1 mL by IntraMUSCular route every thirty (30) days. Max Daily Amount: 200 mg. Indications: abnormal function and activity of the testis 1 mL 5    sertraline (ZOLOFT) 100 mg tablet Take 1 Tab by mouth two (2) times a day. 180 Tab 1    tamsulosin (Flomax) 0.4 mg capsule Take 1 Cap by mouth daily (with dinner). Indications: enlarged prostate with urination problem 90 Cap 1    calcium citrate-vitamin D3 (CITRACAL WITH VITAMIN D MAXIMUM) tablet Take 1 Tab by mouth daily.       insulin lispro (HUMALOG) 100 unit/mL kwikpen ADM 5 UNI SC B LUNCH      levothyroxine (SYNTHROID) 25 mcg tablet Take 1 Tab by mouth Daily (before breakfast).  lisinopriL (PRINIVIL, ZESTRIL) 20 mg tablet Take 20 mg by mouth daily.  loratadine (CLARITIN) 10 mg tablet Take 10 mg by mouth daily.  magnesium oxide (MAG-OX) 400 mg tablet Take 500 mg by mouth daily.  metFORMIN (GLUCOPHAGE) 1,000 mg tablet Take 1 tablet by mouth twice daily.  Omeprazole delayed release (PRILOSEC D/R) 20 mg tablet Take 1 Tab by mouth daily.  simvastatin (ZOCOR) 40 mg tablet Take 40 mg by mouth daily.  multivit-min/FA/lycopen/lutein (CENTRUM SILVER MEN PO) Take 1 Tab by mouth daily.  semaglutide (OZEMPIC) 0.25 mg/0.2 mL (2 mg/1.5 mL) sub-q pen 0.25 mg weekly for 4 weeks and then increase to 0.5 mg weekly (Patient taking differently: 0.5 mg by SubCUTAneous route every seven (7) days. ) 1 Box 3    docosahexanoic acid-eicosapent 120-180 mg cap Take 2 Caps by mouth daily.  insulin NPH/insulin regular (NOVOLIN 70/30, HUMULIN 70/30) 100 unit/mL (70-30) injection Inject 50u in the am and 35u in pm       History reviewed. No pertinent past medical history. History reviewed. No pertinent surgical history. No Known Allergies    REVIEW OF SYSTEMS:  General: He denies any chills, fever, weight loss or gain, appetite or sleeping habits. Eyes: No blurred or visual changes  Neck: No stiffness or swollen nodes  Respiratory: Negative for - acute cough, hemoptysis, shortness of breath, or wheezing  Cardiovascular : Negative for - acute chest pain, orthopnea, edema, palpitations, or shortness of breath  Genito-Urinary: Positive for mild stranguria. Negative for - acute dysuria, frequency, urgency, hematuria, or discharge.   Hematologic: Denies unexplained bruises or bleeding  Lymphatic: Negative for swollen glands/nodes  Integumentary: Denies any new rash or unexplained bruising  Endocrine: Denies malaise/lethargy, hot/cold intolerance, polyuria/polydipsia, or unexpected weight changes. Psychiatric: Continued depression. Social History     Socioeconomic History    Marital status: SINGLE     Spouse name: Not on file    Number of children: Not on file    Years of education: Not on file    Highest education level: Not on file   Tobacco Use    Smoking status: Never Smoker    Smokeless tobacco: Never Used     Family History   Problem Relation Age of Onset    Thyroid Disease Mother     Lung Cancer Father     Heart Disease Father     Diabetes Father     MS Maternal Grandmother     Diabetes Maternal Grandfather     Cancer Paternal Grandfather        OBJECTIVE:     Visit Vitals  /83 (BP 1 Location: Left upper arm, BP Patient Position: Sitting, BP Cuff Size: Large adult)   Pulse 83   Temp 97.9 °F (36.6 °C)   Resp 16   Ht 6' 1\" (1.854 m)   Wt 318 lb (144.2 kg)   SpO2 99%   BMI 41.96 kg/m²       Constitutional: He appears of stated age and dressed appropriately. Eyes: Sclera anicteric, PERRLA, EOMI  Neck: Supple without lymphadenopathy. Respiratory: Clear to ascultation X5, normal inspiratory effort, no adventitious breath sounds. Cardiovascular: Regular rate and rhythm, no significant murmurs, rubs or gallops, PMI not displaced, No thrills, no peripheral edema  Hematologic: No purpura, petechiae or unexplained bruising  Lymphatic: No lymph node enlargemant. Integumentary: No unusual rashes or suspicious skin lesions noted. Neuro: Non-focal exam, A & O X 3.  Psychiatric: Appropriate affect and demeanor, pleasant and cooperative. Patient's thought content and thought processing appear to be within normal limits. ASSESSMENT/PLAN:       ICD-10-CM ICD-9-CM    1. Moderate episode of recurrent major depressive disorder (HCC)  F33.1 296.32    2. Benign prostatic hyperplasia with urinary hesitancy  N40.1 600.01     R39.11 788.64    3.  Hypogonadism in male  E29.1 257.2      1: Patient to increase Zoloft to 100 mg twice daily as previously directed. 2: Patient to continue Flomax 0.4 mg daily for management of BPH.  3: Patient advised to increase exercise patterns to majority days of the week. 4: Patient to follow-up with me in approximately 3 months, or sooner as needed. Patient states understanding and agrees with plan. ATTENTION:   This medical record was transcribed using an electronic medical records system. Although proofread, it may and can contain electronic and spelling errors. Other human spelling and other errors may be present. Corrections may be executed at a later time. Please feel free to contact us for any clarifications as needed. Signed,  Leonora Monique.  Mateus Valladares, MSN APRN FNP-BC

## 2021-03-27 PROBLEM — Z00.00 WELLNESS EXAMINATION: Status: RESOLVED | Noted: 2021-02-25 | Resolved: 2021-03-27

## 2021-05-18 RX ORDER — SIMVASTATIN 40 MG/1
TABLET, FILM COATED ORAL
Qty: 90 TAB | Refills: 1 | Status: SHIPPED | OUTPATIENT
Start: 2021-05-18 | End: 2021-10-18

## 2021-05-18 RX ORDER — METFORMIN HYDROCHLORIDE 1000 MG/1
TABLET ORAL
Qty: 180 TAB | Refills: 1 | Status: SHIPPED | OUTPATIENT
Start: 2021-05-18 | End: 2021-10-18

## 2021-05-18 RX ORDER — LEVOTHYROXINE SODIUM 25 UG/1
TABLET ORAL
Qty: 90 TAB | Refills: 1 | Status: SHIPPED | OUTPATIENT
Start: 2021-05-18 | End: 2021-10-13 | Stop reason: ALTCHOICE

## 2021-06-02 RX ORDER — LISINOPRIL 20 MG/1
TABLET ORAL
Qty: 90 TABLET | Refills: 2 | Status: SHIPPED | OUTPATIENT
Start: 2021-06-02 | End: 2022-02-18

## 2021-06-02 RX ORDER — TRAZODONE HYDROCHLORIDE 50 MG/1
TABLET ORAL
Qty: 90 TABLET | Refills: 0 | Status: SHIPPED | OUTPATIENT
Start: 2021-06-02 | End: 2021-08-17

## 2021-06-02 RX ORDER — GLIPIZIDE 10 MG/1
TABLET ORAL
Qty: 180 TABLET | Refills: 0 | Status: SHIPPED | OUTPATIENT
Start: 2021-06-02 | End: 2021-08-17

## 2021-06-23 ENCOUNTER — OFFICE VISIT (OUTPATIENT)
Dept: ENDOCRINOLOGY | Age: 56
End: 2021-06-23
Payer: COMMERCIAL

## 2021-06-23 VITALS
WEIGHT: 309.4 LBS | DIASTOLIC BLOOD PRESSURE: 81 MMHG | HEART RATE: 81 BPM | SYSTOLIC BLOOD PRESSURE: 134 MMHG | HEIGHT: 73 IN | BODY MASS INDEX: 41.01 KG/M2

## 2021-06-23 DIAGNOSIS — Z79.4 TYPE 2 DIABETES MELLITUS WITH HYPERGLYCEMIA, WITH LONG-TERM CURRENT USE OF INSULIN (HCC): Primary | ICD-10-CM

## 2021-06-23 DIAGNOSIS — E11.65 TYPE 2 DIABETES MELLITUS WITH HYPERGLYCEMIA, WITH LONG-TERM CURRENT USE OF INSULIN (HCC): Primary | ICD-10-CM

## 2021-06-23 DIAGNOSIS — E29.1 HYPOGONADISM IN MALE: ICD-10-CM

## 2021-06-23 DIAGNOSIS — E03.9 ACQUIRED HYPOTHYROIDISM: ICD-10-CM

## 2021-06-23 LAB — HBA1C MFR BLD HPLC: 6.4 %

## 2021-06-23 PROCEDURE — 83036 HEMOGLOBIN GLYCOSYLATED A1C: CPT | Performed by: INTERNAL MEDICINE

## 2021-06-23 PROCEDURE — 99214 OFFICE O/P EST MOD 30 MIN: CPT | Performed by: INTERNAL MEDICINE

## 2021-06-23 NOTE — PROGRESS NOTES
This is a 53-year-old white male with a history of type 2 diabetes mellitus diagnosed in 1999.  Strong family history of diabetes.  His father and his paternal grandfather have or had diabetes. Benjie Lesches was found to have diabetes on a routine physical. Benjie Lesches was placed on metformin and glipizide and over the last 20 years the regimen has been changed to add insulin.  He says his most recent A1c was 10.1%.  The diabetes is complicated by retinopathy and is currently receiving injections into the right.     At my first visit, I added Ozempic which was titrated up to 0.5 mg weekly which he continues to take. He had a recent A1c of 6.8%. His A1c today is 6.4%.     Current Diabetes Medication  Metformin 1000 BID  Glipizide 10 mg BID  70/30 insulin 45 AM and 30 units PM  Ozempic 0.5 mg weekly     This is my first face-to-face visit with him. He says he wakes up in the morning with blood sugars of . When I saw him for the first time his bedtime blood sugars range from 180-200. His blood sugars now range between 100-130 at bedtime. Benjie Lesches works as a  and lives with his mother. He is working from home.     Breakfast is cereal or eggs and toast and grape juice and/or milk. Kina Easley is typically a sandwich or a [place] taco salad.  He drinks a diet Kacie Loly can be a [place] salad or hamburger or shrimp and broccoli or stirfry.  Bedtime is usually a rare cookie or cupcake.  He occasionally walks for 20 to 60 minutes. He does tell me that since starting the Ozempic, he has had decreased appetite and is lost about 9 pounds. He has some symptoms of hypoglycemia when his blood sugar gets into the high 80s but he said no episodes of low blood sugar less than 70.     Examination  Blood pressure 134/81  Pulse 80  Weight 309  BMI 40.8  HEENT unremarkable  Thyroid normal  Lungs clear  Heart reveals a regular rate and rhythm  Abdomen morbidly obese  Extremities unremarkable  Diabetic foot exam:     Left Foot:   Visual Exam: normal    Pulse DP: 2+ (normal)   Filament test: normal sensation    Vibratory sensation: normal      Right Foot:   Visual Exam: ulcer- 1 cm ulcer at the base of the right great toe   Pulse DP: 2+ (normal)   Filament test: normal sensation    Vibratory sensation: normal    Impression  1. Type 2 diabetes mellitus with improving glucose control on combination therapy  2. A right great toe plantar ulcer  3. Morbid obesity    Plan:  1. I have decreased the 70/30 insulin but continue the rest of the regimen  2. I have advised him to watch the right great toe closely and if he notices swelling or erythema to call me right away. Currently does not need antibiotic therapy. 3.  He will probably need  podiatric evaluation. 4.  I will see him back in 3 to 4 months or sooner as needed.

## 2021-06-29 ENCOUNTER — OFFICE VISIT (OUTPATIENT)
Dept: INTERNAL MEDICINE CLINIC | Age: 56
End: 2021-06-29
Payer: COMMERCIAL

## 2021-06-29 VITALS
HEART RATE: 92 BPM | DIASTOLIC BLOOD PRESSURE: 89 MMHG | OXYGEN SATURATION: 98 % | BODY MASS INDEX: 41.24 KG/M2 | RESPIRATION RATE: 16 BRPM | TEMPERATURE: 98.3 F | HEIGHT: 73 IN | SYSTOLIC BLOOD PRESSURE: 133 MMHG | WEIGHT: 311.2 LBS

## 2021-06-29 DIAGNOSIS — I10 ESSENTIAL HYPERTENSION: Primary | ICD-10-CM

## 2021-06-29 DIAGNOSIS — N40.1 BENIGN PROSTATIC HYPERPLASIA WITH URINARY HESITANCY: ICD-10-CM

## 2021-06-29 DIAGNOSIS — R39.11 BENIGN PROSTATIC HYPERPLASIA WITH URINARY HESITANCY: ICD-10-CM

## 2021-06-29 DIAGNOSIS — F33.1 MODERATE EPISODE OF RECURRENT MAJOR DEPRESSIVE DISORDER (HCC): ICD-10-CM

## 2021-06-29 DIAGNOSIS — E66.01 MORBID OBESITY WITH BMI OF 40.0-44.9, ADULT (HCC): ICD-10-CM

## 2021-06-29 PROCEDURE — 99214 OFFICE O/P EST MOD 30 MIN: CPT | Performed by: NURSE PRACTITIONER

## 2021-06-29 NOTE — PROGRESS NOTES
Tanja Castillo is a 50 y.o. male    Chief Complaint   Patient presents with    Diabetes     3 MONTH FOLLOW UP    Blood Pressure Check       Visit Vitals  /89 (BP 1 Location: Left upper arm, BP Patient Position: Sitting, BP Cuff Size: Large adult)   Pulse 92   Temp 98.3 °F (36.8 °C)   Resp 16   Ht 6' 1\" (1.854 m)   Wt 311 lb 3.2 oz (141.2 kg)   SpO2 98%   BMI 41.06 kg/m²           1. Have you been to the ER, urgent care clinic since your last visit? Hospitalized since your last visit? No     2. Have you seen or consulted any other health care providers outside of the [address] [place] [address] since your last visit? Include any pap smears or colon screening.  No

## 2021-06-29 NOTE — PROGRESS NOTES
Chief Complaint   Patient presents with    Diabetes     3 MONTH FOLLOW UP    Blood Pressure Check       SUBJECTIVE:    Marilee Sawant is a 50 y.o. male who is here today for a follow up appointment regarding his depression, hypertension, BPH, and obesity. He states he is doing fairly well overall, and denies any major issues at this time. He continues to take his Zoloft daily for management of his depression, and feels that it is \"helping. \"  He continues to take his medication for hypertension, as well as for his prostate. He thinks the tamsulosin has been helpful somewhat. He continues to be monitored by endocrinology for his diabetes, and has been taking medications as prescribed. Current Outpatient Medications   Medication Sig Dispense Refill    insulin NPH/insulin regular (NOVOLIN 70/30, HUMULIN 70/30) 100 unit/mL (70-30) injection 40 units in morning. 20 units in evening. 30 mL 3    lisinopriL (PRINIVIL, ZESTRIL) 20 mg tablet Take 1 tablet by mouth daily. 90 Tablet 2    traZODone (DESYREL) 50 mg tablet Take 1 tablet by mouth at bedtime. 90 Tablet 0    glipiZIDE (GLUCOTROL) 10 mg tablet Take 1 tablet by mouth twice daily. 180 Tablet 0    levothyroxine (SYNTHROID) 25 mcg tablet Take 1 tablet by mouth daily. Take 30 minutes to 1 hour before breakfast. 90 Tab 1    metFORMIN (GLUCOPHAGE) 1,000 mg tablet Take 1 tablet by mouth twice daily. 180 Tab 1    simvastatin (ZOCOR) 40 mg tablet Take 1 tablet by mouth daily. 90 Tab 1    semaglutide (OZEMPIC) 0.25 mg/0.2 mL (2 mg/1.5 mL) sub-q pen 0.25 mg weekly for 4 weeks and then increase to 0.5 mg weekly 3 Pen 3    testosterone cypionate (DEPOTESTOTERONE CYPIONATE) 200 mg/mL injection 1 mL by IntraMUSCular route every thirty (30) days. Max Daily Amount: 200 mg. Indications: abnormal function and activity of the testis 1 mL 5    sertraline (ZOLOFT) 100 mg tablet Take 1 Tab by mouth two (2) times a day.  180 Tab 1    tamsulosin (Flomax) 0.4 mg capsule Take 1 Cap by mouth daily (with dinner). Indications: enlarged prostate with urination problem 90 Cap 1    calcium citrate-vitamin D3 (CITRACAL WITH VITAMIN D MAXIMUM) tablet Take 1 Tab by mouth daily.  loratadine (CLARITIN) 10 mg tablet Take 10 mg by mouth daily.  magnesium oxide (MAG-OX) 400 mg tablet Take 500 mg by mouth daily.  Omeprazole delayed release (PRILOSEC D/R) 20 mg tablet Take 1 Tab by mouth daily.  multivit-min/FA/lycopen/lutein (CENTRUM SILVER MEN PO) Take 1 Tab by mouth daily.  docosahexanoic acid-eicosapent 120-180 mg cap Take 2 Caps by mouth daily. (Patient not taking: Reported on 6/29/2021)      insulin lispro (HUMALOG) 100 unit/mL kwikpen ADM 5 UNI SC B LUNCH (Patient not taking: Reported on 6/22/2021)       History reviewed. No pertinent past medical history. History reviewed. No pertinent surgical history.   No Known Allergies    REVIEW OF SYSTEMS:                                        POSITIVE= bold text  Negative = regular text    General:                     fever, chills, sweats, generalized weakness, weight loss/gain,                                       loss of appetite   Eyes:                           blurred vision, eye pain, loss of vision, double vision  ENT:                            rhinorrhea, pharyngitis   Respiratory:               cough, sputum production, SOB, LEONARD, wheezing, pleuritic pain   Cardiology:                chest pain, palpitations, orthopnea, PND, edema, syncope   Gastrointestinal:       abdominal pain , N/V, diarrhea, dysphagia, constipation, bleeding   Genitourinary:           frequency, urgency, dysuria, hematuria, incontinence   Muskuloskeletal :      arthralgia, myalgia, back pain  Hematology:              easy bruising, nose or gum bleeding, lymphadenopathy   Dermatological:         rash, ulceration, pruritis, color change / jaundice  Endocrine:                 hot flashes or polydipsia   Neurological:             headache, dizziness, confusion, focal weakness, paresthesia,                                      Speech difficulties, memory loss, gait difficulty  Psychological:          Feelings of anxiety, depression, agitation        Social History     Socioeconomic History    Marital status: SINGLE     Spouse name: Not on file    Number of children: Not on file    Years of education: Not on file    Highest education level: Not on file   Tobacco Use    Smoking status: Never Smoker    Smokeless tobacco: Never Used   Substance and Sexual Activity    Alcohol use: Yes     Social Determinants of Health     Financial Resource Strain:     Difficulty of Paying Living Expenses:    Food Insecurity:     Worried About Running Out of Food in the Last Year:     [address] in the Last Year:    Transportation Needs:     Lack of Transportation (Medical):  Lack of Transportation (Non-Medical):    Physical Activity:     Days of Exercise per Week:     Minutes of Exercise per Session:    Stress:     Feeling of Stress :    Social Connections:     Frequency of Communication with Friends and Family:     Frequency of Social Gatherings with Friends and Family:     Attends Anabaptism Services:     Active Member of Clubs or Organizations:     Attends Club or Organization Meetings:     Marital Status:      Family History   Problem Relation Age of Onset    Thyroid Disease Mother     Lung Cancer Father     Heart Disease Father     Diabetes Father     MS Maternal Grandmother     Diabetes Maternal Grandfather     Cancer Paternal Grandfather        OBJECTIVE:     Visit Vitals  /89 (BP 1 Location: Left upper arm, BP Patient Position: Sitting, BP Cuff Size: Large adult)   Pulse 92   Temp 98.3 °F (36.8 °C)   Resp 16   Ht 6' 1\" (1.854 m)   Wt 311 lb 3.2 oz (141.2 kg)   SpO2 98%   BMI 41.06 kg/m²       Constitutional: He appears well nourished, of stated age, and dressed appropriately.   Eyes: Sclera anicteric, PERRLA, EOMI  Neck: Supple without lymphadenopathy. Thyroid normal, No JVD or bruits  Respiratory: Clear to ascultation X5, normal inspiratory effort, no adventitious breath sounds. Cardiovascular: Regular rate and rhythm, no murmurs, rubs or gallops, PMI not displaced, No thrills, no peripheral edema  Neuro: Non-focal exam, A & O X 3, DTRs equal and adequate. Psychiatric: Appropriate affect and demeanor, pleasant and cooperative. Patient's thought content and thought processing appear to be within normal limits. ASSESSMENT/PLAN:     ICD-10-CM ICD-9-CM    1. Essential hypertension  I10 401.9    2. Benign prostatic hyperplasia with urinary hesitancy  N40.1 600.01     R39.11 788.64    3. Moderate episode of recurrent major depressive disorder ([facility])  F33.1 296.32    4. Morbid obesity with BMI of 40.0-44.9, adult ([facility])  E66.01 278.01     Z68.41 V85.41      1: Patient to continue current medication for management of hypertension. 2: Patient to continue tamsulosin for BPH symptoms. 3: Patient to continue Zoloft for management of depression. This appears to be stable at this time. 4: Patient to continue all other medications as directed. 5: Patient to be watchful of diet, avoid concentrated sweets, and work on regular patterns of exercise for weight loss. 6: Patient to follow-up with me in approximately 3 months, or sooner as needed. Patient states understanding and agrees with plan. ATTENTION:   This medical record was transcribed using an electronic medical records system. Although proofread, it may and can contain electronic and spelling errors. Other human spelling and other errors may be present. Corrections may be executed at a later time. Please feel free to contact us for any clarifications as needed. Follow-up and Dispositions    · Return in about 3 months (around 9/29/2021) for Follow up. Signed,  Mirtha Roth.  Henry Workman, MSN APRN FNP-BC

## 2021-08-17 RX ORDER — GLIPIZIDE 10 MG/1
TABLET ORAL
Qty: 180 TABLET | Refills: 0 | Status: SHIPPED | OUTPATIENT
Start: 2021-08-17 | End: 2021-11-18

## 2021-08-17 RX ORDER — TRAZODONE HYDROCHLORIDE 50 MG/1
TABLET ORAL
Qty: 90 TABLET | Refills: 0 | Status: SHIPPED | OUTPATIENT
Start: 2021-08-17 | End: 2021-11-18

## 2021-09-09 DIAGNOSIS — E29.1 HYPOGONADISM IN MALE: ICD-10-CM

## 2021-09-09 DIAGNOSIS — N62 GYNECOMASTIA, MALE: ICD-10-CM

## 2021-09-09 RX ORDER — TESTOSTERONE CYPIONATE 200 MG/ML
200 INJECTION INTRAMUSCULAR
Qty: 1 ML | Refills: 5 | Status: SHIPPED | OUTPATIENT
Start: 2021-09-09 | End: 2022-04-08

## 2021-09-09 NOTE — TELEPHONE ENCOUNTER
Last Refill: 3/2/21  Last Visit: 2021   Next Visit: 2021    Requested Prescriptions     Pending Prescriptions Disp Refills    testosterone cypionate (DEPOTESTOTERONE CYPIONATE) 200 mg/mL injection 1 mL 5     Si mL by IntraMUSCular route every thirty (30) days. Max Daily Amount: 200 mg.  Indications: abnormal function and activity of the testis

## 2021-09-13 DIAGNOSIS — N40.1 BENIGN PROSTATIC HYPERPLASIA WITH URINARY HESITANCY: ICD-10-CM

## 2021-09-13 DIAGNOSIS — R39.11 BENIGN PROSTATIC HYPERPLASIA WITH URINARY HESITANCY: ICD-10-CM

## 2021-09-13 RX ORDER — TAMSULOSIN HYDROCHLORIDE 0.4 MG/1
0.4 CAPSULE ORAL
Qty: 90 CAPSULE | Refills: 1 | Status: SHIPPED | OUTPATIENT
Start: 2021-09-13 | End: 2022-04-04

## 2021-09-13 NOTE — TELEPHONE ENCOUNTER
Last Refill: 02/25/21  Last Visit: 6/29/2021   Next Visit: 9/27/2021    Requested Prescriptions     Pending Prescriptions Disp Refills    tamsulosin (Flomax) 0.4 mg capsule 90 Capsule 1     Sig: Take 1 Capsule by mouth daily (with dinner).  Indications: enlarged prostate with urination problem

## 2021-09-27 ENCOUNTER — OFFICE VISIT (OUTPATIENT)
Dept: INTERNAL MEDICINE CLINIC | Age: 56
End: 2021-09-27
Payer: COMMERCIAL

## 2021-09-27 VITALS
TEMPERATURE: 98.4 F | OXYGEN SATURATION: 97 % | DIASTOLIC BLOOD PRESSURE: 72 MMHG | RESPIRATION RATE: 16 BRPM | HEART RATE: 84 BPM | BODY MASS INDEX: 41.3 KG/M2 | HEIGHT: 73 IN | WEIGHT: 311.6 LBS | SYSTOLIC BLOOD PRESSURE: 128 MMHG

## 2021-09-27 DIAGNOSIS — F33.1 MODERATE EPISODE OF RECURRENT MAJOR DEPRESSIVE DISORDER (HCC): ICD-10-CM

## 2021-09-27 DIAGNOSIS — Z23 ENCOUNTER FOR IMMUNIZATION: ICD-10-CM

## 2021-09-27 DIAGNOSIS — R39.11 BENIGN PROSTATIC HYPERPLASIA WITH URINARY HESITANCY: ICD-10-CM

## 2021-09-27 DIAGNOSIS — F51.01 PRIMARY INSOMNIA: ICD-10-CM

## 2021-09-27 DIAGNOSIS — N40.1 BENIGN PROSTATIC HYPERPLASIA WITH URINARY HESITANCY: ICD-10-CM

## 2021-09-27 DIAGNOSIS — E03.9 ACQUIRED HYPOTHYROIDISM: ICD-10-CM

## 2021-09-27 DIAGNOSIS — Z79.899 ON STATIN THERAPY: ICD-10-CM

## 2021-09-27 DIAGNOSIS — I10 ESSENTIAL HYPERTENSION: Primary | ICD-10-CM

## 2021-09-27 DIAGNOSIS — E29.1 HYPOGONADISM IN MALE: ICD-10-CM

## 2021-09-27 DIAGNOSIS — Z79.890 LONG-TERM CURRENT USE OF TESTOSTERONE CYPIONATE: ICD-10-CM

## 2021-09-27 DIAGNOSIS — E78.00 HYPERCHOLESTEROLEMIA: ICD-10-CM

## 2021-09-27 LAB
ALBUMIN SERPL-MCNC: 3.8 G/DL (ref 3.5–5)
ALBUMIN/GLOB SERPL: 1.4 {RATIO} (ref 1.1–2.2)
ALP SERPL-CCNC: 118 U/L (ref 45–117)
ALT SERPL-CCNC: 28 U/L (ref 12–78)
ANION GAP SERPL CALC-SCNC: 6 MMOL/L (ref 5–15)
AST SERPL-CCNC: 15 U/L (ref 15–37)
BILIRUB SERPL-MCNC: 0.3 MG/DL (ref 0.2–1)
BUN SERPL-MCNC: 16 MG/DL (ref 6–20)
BUN/CREAT SERPL: 21 (ref 12–20)
CALCIUM SERPL-MCNC: 9.3 MG/DL (ref 8.5–10.1)
CHLORIDE SERPL-SCNC: 106 MMOL/L (ref 97–108)
CHOLEST SERPL-MCNC: 125 MG/DL
CO2 SERPL-SCNC: 26 MMOL/L (ref 21–32)
CREAT SERPL-MCNC: 0.77 MG/DL (ref 0.7–1.3)
GLOBULIN SER CALC-MCNC: 2.7 G/DL (ref 2–4)
GLUCOSE SERPL-MCNC: 168 MG/DL (ref 65–100)
HDLC SERPL-MCNC: 35 MG/DL
HDLC SERPL: 3.6 {RATIO} (ref 0–5)
LDLC SERPL CALC-MCNC: 26.8 MG/DL (ref 0–100)
POTASSIUM SERPL-SCNC: 3.9 MMOL/L (ref 3.5–5.1)
PROT SERPL-MCNC: 6.5 G/DL (ref 6.4–8.2)
SODIUM SERPL-SCNC: 138 MMOL/L (ref 136–145)
TRIGL SERPL-MCNC: 316 MG/DL (ref ?–150)
TSH SERPL DL<=0.05 MIU/L-ACNC: 5.05 UIU/ML (ref 0.36–3.74)
VLDLC SERPL CALC-MCNC: 63.2 MG/DL

## 2021-09-27 PROCEDURE — 90471 IMMUNIZATION ADMIN: CPT | Performed by: NURSE PRACTITIONER

## 2021-09-27 PROCEDURE — 90686 IIV4 VACC NO PRSV 0.5 ML IM: CPT | Performed by: NURSE PRACTITIONER

## 2021-09-27 PROCEDURE — 99214 OFFICE O/P EST MOD 30 MIN: CPT | Performed by: NURSE PRACTITIONER

## 2021-09-27 RX ORDER — ZOLPIDEM TARTRATE 5 MG/1
5 TABLET ORAL
Qty: 30 TABLET | Refills: 2 | Status: SHIPPED | OUTPATIENT
Start: 2021-09-27

## 2021-09-27 NOTE — PROGRESS NOTES
Chief Complaint   Patient presents with    Labs     3 month follow up       SUBJECTIVE:    Lila Negron is a 50 y.o. male who is here today for a follow up appointment regarding current medical history including: Hypertension, hypercholesterolemia, major depressive disorder, hypothyroidism, insomnia, BPH, and hypogonadism. Patient states he is feeling fairly well overall, but continues to have difficulty falling asleep at night. He was previously prescribed trazodone to help with this, but finds that he continues to have difficulty at onset of sleep, often lasting 1 to 2 hours on average after going to bed. He has tried adding some over-the-counter melatonin to his trazodone which worked somewhat better. He is wondering if there is anything further to do. The patient is also currently being administered testosterone cypionate injections monthly as prescribed. He is tolerating the medication well, and denies any adverse side effects. He is currently being treated for BPH, and taking tamsulosin as prescribed which has been somewhat helpful as well. His last PSA was approximately 0.7 ng/ml. He continues to take his lisinopril for management of hypertension, but does not check his blood pressures on a regular basis. He continues to take his simvastatin nightly for management of his cholesterol, and levothyroxine for his thyroid. He denies any adverse side effects of the medications. He continues to take Zoloft 100 mg daily for management of his depression. He states he recently started seeing a therapist online, but has only had 1 session thus far which was mostly for introductory reasons. He is currently being managed by endocrinology for his diabetes. He has an appointment soon. Current Outpatient Medications   Medication Sig Dispense Refill    tamsulosin (Flomax) 0.4 mg capsule Take 1 Capsule by mouth daily (with dinner).  Indications: enlarged prostate with urination problem 90 Capsule 1    testosterone cypionate (DEPOTESTOTERONE CYPIONATE) 200 mg/mL injection 1 mL by IntraMUSCular route every thirty (30) days. Max Daily Amount: 200 mg. Indications: abnormal function and activity of the testis 1 mL 5    traZODone (DESYREL) 50 mg tablet Take 1 tablet by mouth at bedtime. 90 Tablet 0    glipiZIDE (GLUCOTROL) 10 mg tablet Take 1 tablet by mouth twice daily. 180 Tablet 0    insulin NPH/insulin regular (NOVOLIN 70/30, HUMULIN 70/30) 100 unit/mL (70-30) injection 40 units in morning. 20 units in evening. 30 mL 3    lisinopriL (PRINIVIL, ZESTRIL) 20 mg tablet Take 1 tablet by mouth daily. 90 Tablet 2    levothyroxine (SYNTHROID) 25 mcg tablet Take 1 tablet by mouth daily. Take 30 minutes to 1 hour before breakfast. 90 Tab 1    metFORMIN (GLUCOPHAGE) 1,000 mg tablet Take 1 tablet by mouth twice daily. 180 Tab 1    simvastatin (ZOCOR) 40 mg tablet Take 1 tablet by mouth daily. 90 Tab 1    semaglutide (OZEMPIC) 0.25 mg/0.2 mL (2 mg/1.5 mL) sub-q pen 0.25 mg weekly for 4 weeks and then increase to 0.5 mg weekly 3 Pen 3    sertraline (ZOLOFT) 100 mg tablet Take 1 Tab by mouth two (2) times a day. 180 Tab 1    calcium citrate-vitamin D3 (CITRACAL WITH VITAMIN D MAXIMUM) tablet Take 1 Tab by mouth daily.  loratadine (CLARITIN) 10 mg tablet Take 10 mg by mouth daily.  magnesium oxide (MAG-OX) 400 mg tablet Take 400 mg by mouth daily.  Omeprazole delayed release (PRILOSEC D/R) 20 mg tablet Take 1 Tab by mouth daily.  multivit-min/FA/lycopen/lutein (CENTRUM SILVER MEN PO) Take 1 Tab by mouth daily. History reviewed. No pertinent past medical history. History reviewed. No pertinent surgical history.   No Known Allergies    REVIEW OF SYSTEMS:                                        POSITIVE= bold text  Negative = regular text    General:                     fever, chills, sweats, generalized weakness, weight loss/gain,                                       loss of appetite Eyes:                           blurred vision, eye pain, loss of vision, double vision  ENT:                            rhinorrhea, pharyngitis   Respiratory:               cough, sputum production, SOB, LEONARD, wheezing, pleuritic pain   Cardiology:                chest pain, palpitations, orthopnea, PND, edema, syncope   Gastrointestinal:       abdominal pain , N/V, diarrhea, dysphagia, constipation, bleeding   Genitourinary:           frequency, urgency, dysuria, hematuria, incontinence   Muskuloskeletal :      arthralgia, myalgia, back pain  Hematology:              easy bruising, nose or gum bleeding, lymphadenopathy   Dermatological:         rash, ulceration, pruritis, color change / jaundice  Endocrine:                 hot flashes or polydipsia   Neurological:             headache, dizziness, confusion, focal weakness, paresthesia,                                      Speech difficulties, memory loss, gait difficulty  Psychological:          Feelings of anxiety, depression, agitation        Social History     Socioeconomic History    Marital status: SINGLE     Spouse name: Not on file    Number of children: Not on file    Years of education: Not on file    Highest education level: Not on file   Tobacco Use    Smoking status: Never Smoker    Smokeless tobacco: Never Used   Substance and Sexual Activity    Alcohol use: Yes     Social Determinants of Health     Financial Resource Strain:     Difficulty of Paying Living Expenses:    Food Insecurity:     Worried About Running Out of Food in the Last Year:     Ran Out of Food in the Last Year:    Transportation Needs:     Lack of Transportation (Medical):      Lack of Transportation (Non-Medical):    Physical Activity:     Days of Exercise per Week:     Minutes of Exercise per Session:    Stress:     Feeling of Stress :    Social Connections:     Frequency of Communication with Friends and Family:     Frequency of Social Gatherings with Friends and Family:     Attends [employer] Services:     Active Member of Clubs or Organizations:     Attends Club or Organization Meetings:     Marital Status:      Family History   Problem Relation Age of Onset    Thyroid Disease Mother     Lung Cancer Father     Heart Disease Father     Diabetes Father     MS Maternal Grandmother     Diabetes Maternal Grandfather     Cancer Paternal Grandfather        OBJECTIVE:     There were no vitals taken for this visit. Constitutional: He appears well nourished, of stated age, and dressed appropriately. Eyes: Sclera anicteric, PERRLA, EOMI  Neck: Supple without lymphadenopathy. Thyroid normal, No JVD or bruits  Respiratory: Clear to ascultation X5, normal inspiratory effort, no adventitious breath sounds. Cardiovascular: Regular rate and rhythm, no murmurs, rubs or gallops, PMI not displaced, No thrills, no peripheral edema  Hematologic: No purpura, petechiae or unexplained bruising  Lymphatic: No lymph node enlargemant. Neuro: Non-focal exam, A & O X 3.  Psychiatric: Appropriate affect and demeanor, pleasant and cooperative. Patient's thought content and thought processing appear to be within normal limits. ASSESSMENT/PLAN:     ICD-10-CM ICD-9-CM    1. Essential hypertension  L64 364.7 METABOLIC PANEL, COMPREHENSIVE      LIPID PANEL      LIPID PANEL      METABOLIC PANEL, COMPREHENSIVE   2. Hypercholesterolemia  E78.00 272.0 LIPID PANEL      LIPID PANEL   3. Acquired hypothyroidism  E03.9 244.9 TSH 3RD GENERATION      TSH 3RD GENERATION   4. Moderate episode of recurrent major depressive disorder (HCC)  F33.1 296.32    5. Primary insomnia  F51.01 307.42 zolpidem (AMBIEN) 5 mg tablet   6. Benign prostatic hyperplasia with urinary hesitancy  N40.1 600.01 REFERRAL TO UROLOGY    R39.11 788.64    7. Hypogonadism in male  E29.1 257.2 REFERRAL TO UROLOGY   8.  On statin therapy  I64.646 U91.10 METABOLIC PANEL, COMPREHENSIVE      LIPID PANEL      LIPID PANEL      METABOLIC PANEL, COMPREHENSIVE   9. Encounter for immunization  Z23 V03.89 INFLUENZA VIRUS VAC QUAD,SPLIT,PRESV FREE SYRINGE IM   10. Long-term current use of testosterone cypionate  Z79.890 V58.69 REFERRAL TO UROLOGY     1: I will add Ambien 5 mg for patient to take nightly due to sleep onset insomnia. Patient may continue to use trazodone as well.  2: Patient will be referred to urology due to testosterone replacement and BPH issues. 3: Patient to continue Zoloft 100 mg daily for management of major depressive disorder. 4: Patient to continue lisinopril, levothyroxine, and simvastatin for management of hypertension, hypothyroidism, and hypercholesterolemia.  5: Patient to continue healthy lifestyle management including: Low-fat/low-cholesterol diet, avoidance of concentrated sweets, adequate amounts of fiber and water, and exercise as tolerated. 6: Patient to follow-up with me in approximately 4 months for recheck. Patient states understanding and agrees with plan.  7: Patient will be administered flu vaccine today. ATTENTION:   This medical record was transcribed using an electronic medical records system. Although proofread, it may and can contain electronic and spelling errors. Other human spelling and other errors may be present. Corrections may be executed at a later time. Please feel free to contact us for any clarifications as needed. Signed,  Hannah Mathis.  Nancy Gibbs, MSN APRN FNP-BC

## 2021-09-27 NOTE — PROGRESS NOTES
After obtaining consent, and per orders of AMINA MOJICA,NP injection of FLU VACCINE given by [employer]. Patient instructed to remain in clinic for 20 minutes afterwards, and to report any adverse reaction to me immediately. Administered FLU VACCINE in RIGHT DELTOID patient tolerated well.     LOT #:[id number]     EXP:6/30/22    JESSICA:[id number]

## 2021-09-27 NOTE — PROGRESS NOTES
Henri Huff is a 50 y.o. male    Chief Complaint   Patient presents with    Labs     3 month follow up       Visit Vitals  /72 (BP 1 Location: Left upper arm, BP Patient Position: Sitting, BP Cuff Size: Large adult)   Pulse 84   Temp 98.4 °F (36.9 °C)   Resp 16   Ht 6' 1\" (1.854 m)   Wt 311 lb 9.6 oz (141.3 kg)   SpO2 97%   BMI 41.11 kg/m²           1. Have you been to the ER, urgent care clinic since your last visit? Hospitalized since your last visit? NO    2. Have you seen or consulted any other health care providers outside of the [address] [place] [address] since your last visit? Include any pap smears or colon screening.  NO

## 2021-09-29 NOTE — PROGRESS NOTES
Your thyroid is slightly underfunctioning with a elevated TSH of 5.05 -you may want to tell your endocrinologist about this. Cholesterol levels are in acceptable range. However, triglycerides are a bit elevated. We will continue to watch this. Kidney functions, liver functions, and electrolytes are okay.

## 2021-10-13 ENCOUNTER — OFFICE VISIT (OUTPATIENT)
Dept: ENDOCRINOLOGY | Age: 56
End: 2021-10-13
Payer: COMMERCIAL

## 2021-10-13 VITALS
DIASTOLIC BLOOD PRESSURE: 77 MMHG | WEIGHT: 305.6 LBS | HEART RATE: 87 BPM | BODY MASS INDEX: 40.5 KG/M2 | SYSTOLIC BLOOD PRESSURE: 131 MMHG | HEIGHT: 73 IN

## 2021-10-13 DIAGNOSIS — E03.9 ACQUIRED HYPOTHYROIDISM: ICD-10-CM

## 2021-10-13 DIAGNOSIS — E11.65 TYPE 2 DIABETES MELLITUS WITH HYPERGLYCEMIA, WITH LONG-TERM CURRENT USE OF INSULIN (HCC): Primary | ICD-10-CM

## 2021-10-13 DIAGNOSIS — E29.1 HYPOGONADISM IN MALE: ICD-10-CM

## 2021-10-13 DIAGNOSIS — Z79.4 TYPE 2 DIABETES MELLITUS WITH HYPERGLYCEMIA, WITH LONG-TERM CURRENT USE OF INSULIN (HCC): Primary | ICD-10-CM

## 2021-10-13 LAB — HBA1C MFR BLD HPLC: 6.1 %

## 2021-10-13 PROCEDURE — 83036 HEMOGLOBIN GLYCOSYLATED A1C: CPT | Performed by: INTERNAL MEDICINE

## 2021-10-13 PROCEDURE — 99214 OFFICE O/P EST MOD 30 MIN: CPT | Performed by: INTERNAL MEDICINE

## 2021-10-13 RX ORDER — LEVOTHYROXINE SODIUM 50 UG/1
50 TABLET ORAL
Qty: 90 TABLET | Refills: 3 | Status: SHIPPED | OUTPATIENT
Start: 2021-10-13 | End: 2022-02-11 | Stop reason: ALTCHOICE

## 2021-10-13 NOTE — PROGRESS NOTES
This is a 46-year-old white male with a history of type 2 diabetes mellitus diagnosed in 1999.  Strong family history of diabetes.  His father and his paternal grandfather have or had diabetes. Nathan Shannon was found to have diabetes on a routine physical. Nathan Shannon was placed on metformin and glipizide and over the last 20 years the regimen has been changed to add insulin.  He says his most recent A1c was 10.1%.  The diabetes is complicated by retinopathy and is currently receiving injections into the right.     At my first visit, I added Ozempic which was titrated up to 0.5 mg weekly which he continues to take. Nathan Shannon had a recent A1c of 6.4%. His A1c today is 6.0%.     Current Diabetes Medication  Metformin 1000 BID  Glipizide 10 mg BID  70/30 insulin 40 AM and 20 units PM  Ozempic 0.5 mg weeky    He says he wakes up in the morning with blood sugars of .  When I saw him for the first time his bedtime blood sugars range from 180-200.  His blood sugars now range between 100-130 at bedtime.  He works as a  and lives with his mother. He is working from home.     Breakfast is cereal or eggs and toast and grape juice and/or milk. Delmis Broussard is typically a sandwich or a [place] taco salad.  He drinks a diet [employer] Ion can be a [place] salad or hamburger or shrimp and broccoli or stirfry.  Bedtime is usually a rare cookie or cupcake.  He occasionally walks for 20 to 60 minutes.  He does tell me that [facility], he has had decreased appetite and is lost about 15 pounds. Nathan Shannon has some symptoms of hypoglycemia when his blood sugar gets into the high 80s but he said no episodes of low blood sugar less than 70. He was also recently seen by his primary care provider who obtained a TSH of 5. The patient is on 25 mcg of levothyroxine daily with a TSH that is increased from 3.5 in March to 5 in September.     Examination  Blood pressure 131/77  Pulse 74  Weight 305 (he has lost 15 pounds)  BMI 40. 3    Impression  1. Type 2 diabetes mellitus with improving glucose control on combination therapy  2. Primary hypothyroidism on 25 mcg of levothyroxine daily. Please note he has been not taking it on an empty stomach    Plan:  1. I have increased the Ozempic to 1 mg weekly. 2. I have advised him to decrease the insulin to 30 units in the morning and 10 units in the evening. 3.  I have increased his levothyroxine to 50 mcg and advised him to take it first thing in the morning on an empty stomach  4.   I will see him back in 3 to 4 months.

## 2021-10-18 RX ORDER — METFORMIN HYDROCHLORIDE 1000 MG/1
TABLET ORAL
Qty: 180 TABLET | Refills: 0 | Status: SHIPPED | OUTPATIENT
Start: 2021-10-18 | End: 2022-01-17

## 2021-10-18 RX ORDER — SIMVASTATIN 40 MG/1
TABLET, FILM COATED ORAL
Qty: 90 TABLET | Refills: 0 | Status: SHIPPED | OUTPATIENT
Start: 2021-10-18 | End: 2022-01-17

## 2021-11-18 RX ORDER — TRAZODONE HYDROCHLORIDE 50 MG/1
TABLET ORAL
Qty: 90 TABLET | Refills: 0 | Status: SHIPPED | OUTPATIENT
Start: 2021-11-18 | End: 2022-02-18

## 2021-11-18 RX ORDER — GLIPIZIDE 10 MG/1
TABLET ORAL
Qty: 180 TABLET | Refills: 0 | Status: SHIPPED | OUTPATIENT
Start: 2021-11-18 | End: 2022-02-18

## 2022-01-17 RX ORDER — METFORMIN HYDROCHLORIDE 1000 MG/1
TABLET ORAL
Qty: 180 TABLET | Refills: 0 | Status: SHIPPED | OUTPATIENT
Start: 2022-01-17 | End: 2022-04-18

## 2022-01-17 RX ORDER — SIMVASTATIN 40 MG/1
TABLET, FILM COATED ORAL
Qty: 90 TABLET | Refills: 0 | Status: SHIPPED | OUTPATIENT
Start: 2022-01-17 | End: 2022-04-18

## 2022-01-24 ENCOUNTER — OFFICE VISIT (OUTPATIENT)
Dept: INTERNAL MEDICINE CLINIC | Age: 57
End: 2022-01-24
Payer: COMMERCIAL

## 2022-01-24 VITALS
RESPIRATION RATE: 16 BRPM | DIASTOLIC BLOOD PRESSURE: 80 MMHG | HEART RATE: 80 BPM | SYSTOLIC BLOOD PRESSURE: 126 MMHG | WEIGHT: 310.2 LBS | HEIGHT: 73 IN | BODY MASS INDEX: 41.11 KG/M2 | OXYGEN SATURATION: 99 % | TEMPERATURE: 98 F

## 2022-01-24 DIAGNOSIS — E11.69 MIXED HYPERLIPIDEMIA DUE TO TYPE 2 DIABETES MELLITUS (HCC): ICD-10-CM

## 2022-01-24 DIAGNOSIS — I10 ESSENTIAL HYPERTENSION: Primary | ICD-10-CM

## 2022-01-24 DIAGNOSIS — F32.1 CURRENT MODERATE EPISODE OF MAJOR DEPRESSIVE DISORDER WITHOUT PRIOR EPISODE (HCC): ICD-10-CM

## 2022-01-24 DIAGNOSIS — E78.2 MIXED HYPERLIPIDEMIA DUE TO TYPE 2 DIABETES MELLITUS (HCC): ICD-10-CM

## 2022-01-24 DIAGNOSIS — F41.9 ANXIETY: ICD-10-CM

## 2022-01-24 DIAGNOSIS — Z79.899 ON STATIN THERAPY: ICD-10-CM

## 2022-01-24 DIAGNOSIS — F51.01 PRIMARY INSOMNIA: ICD-10-CM

## 2022-01-24 DIAGNOSIS — E66.01 CLASS 3 SEVERE OBESITY DUE TO EXCESS CALORIES WITH SERIOUS COMORBIDITY AND BODY MASS INDEX (BMI) OF 40.0 TO 44.9 IN ADULT (HCC): ICD-10-CM

## 2022-01-24 DIAGNOSIS — E03.9 ACQUIRED HYPOTHYROIDISM: ICD-10-CM

## 2022-01-24 LAB
ALBUMIN SERPL-MCNC: 4.1 G/DL (ref 3.5–5)
ALBUMIN/GLOB SERPL: 1.5 {RATIO} (ref 1.1–2.2)
ALP SERPL-CCNC: 106 U/L (ref 45–117)
ALT SERPL-CCNC: 28 U/L (ref 12–78)
ANION GAP SERPL CALC-SCNC: 5 MMOL/L (ref 5–15)
AST SERPL-CCNC: 15 U/L (ref 15–37)
BILIRUB SERPL-MCNC: 0.3 MG/DL (ref 0.2–1)
BUN SERPL-MCNC: 12 MG/DL (ref 6–20)
BUN/CREAT SERPL: 16 (ref 12–20)
CALCIUM SERPL-MCNC: 9.3 MG/DL (ref 8.5–10.1)
CHLORIDE SERPL-SCNC: 102 MMOL/L (ref 97–108)
CHOLEST SERPL-MCNC: 138 MG/DL
CO2 SERPL-SCNC: 29 MMOL/L (ref 21–32)
CREAT SERPL-MCNC: 0.76 MG/DL (ref 0.7–1.3)
GLOBULIN SER CALC-MCNC: 2.7 G/DL (ref 2–4)
GLUCOSE SERPL-MCNC: 157 MG/DL (ref 65–100)
HDLC SERPL-MCNC: 30 MG/DL
HDLC SERPL: 4.6 {RATIO} (ref 0–5)
LDLC SERPL CALC-MCNC: 54 MG/DL (ref 0–100)
POTASSIUM SERPL-SCNC: 4 MMOL/L (ref 3.5–5.1)
PROT SERPL-MCNC: 6.8 G/DL (ref 6.4–8.2)
SODIUM SERPL-SCNC: 136 MMOL/L (ref 136–145)
TRIGL SERPL-MCNC: 270 MG/DL (ref ?–150)
TSH SERPL DL<=0.05 MIU/L-ACNC: 5.15 UIU/ML (ref 0.36–3.74)
VLDLC SERPL CALC-MCNC: 54 MG/DL

## 2022-01-24 PROCEDURE — 99214 OFFICE O/P EST MOD 30 MIN: CPT | Performed by: NURSE PRACTITIONER

## 2022-01-24 NOTE — PROGRESS NOTES
Whitney Fox is a 49 y.o. male    Chief Complaint   Patient presents with    Labs     4 month follow up       Visit Vitals  BP (!) 138/90 (BP 1 Location: Left upper arm, BP Patient Position: Sitting, BP Cuff Size: Large adult)   Pulse 80   Temp 98 °F (36.7 °C)   Resp 16   Ht 6' 1\" (1.854 m)   Wt 310 lb 3.2 oz (140.7 kg)   SpO2 99%   BMI 40.93 kg/m²           1. Have you been to the ER, urgent care clinic since your last visit? Hospitalized since your last visit? NO    2. Have you seen or consulted any other health care providers outside of the [address] [place] [address] since your last visit? Include any pap smears or colon screening.  NO

## 2022-01-24 NOTE — PROGRESS NOTES
Chief Complaint   Patient presents with    Labs     4 month follow up       SUBJECTIVE:    Amina Arechiga is a 49 y.o. male who is here today for a follow up appointment regarding his hypertension, insomnia, depression, anxiety, hyperlipidemia. He was incidentally found to have an elevated TSH on last labs, and continues to follow-up with Dr. Dom Perea for his endocrine issues. He denies any new or acute complaints at this time. The patient continues to take his medication as prescribed for management of his hypertension. His blood pressure appears well controlled at this time. He denies any chest pain, chest pressure, shortness of breath, headaches, dizziness, blurred vision, palpitations, or syncope episodes. He continues to take his cholesterol-lowering medication as well, and is tolerating this well without symptoms of myalgias or joint pain. Patient is also being managed for depression and anxiety and is currently taking sertraline as prescribed. He states he has been trying CBD capsules in hopes that it would help some of his anxiety from time to time. He thinks it has been somewhat helpful. The patient continues to take medication for management of his insomnia. He states this has been working fairly well. Current Outpatient Medications   Medication Sig Dispense Refill    dietary supplement cap Take  by mouth. CBD capsules      metFORMIN (GLUCOPHAGE) 1,000 mg tablet Take 1 tablet by mouth twice daily. 180 Tablet 0    simvastatin (ZOCOR) 40 mg tablet Take 1 tablet by mouth daily. 90 Tablet 0    traZODone (DESYREL) 50 mg tablet Take 1 tablet by mouth at bedtime. 90 Tablet 0    glipiZIDE (GLUCOTROL) 10 mg tablet Take 1 tablet by mouth twice daily. 180 Tablet 0    semaglutide (OZEMPIC) 1 mg/dose (2 mg/1.5 mL) sub-q pen 1 mg by SubCUTAneous route every seven (7) days. 4 Box 3    levothyroxine (SYNTHROID) 50 mcg tablet Take 1 Tablet by mouth Daily (before breakfast).  90 Tablet 3    zolpidem (AMBIEN) 5 mg tablet Take 1 Tablet by mouth nightly as needed for Sleep. Max Daily Amount: 5 mg. Indications: difficulty falling asleep 30 Tablet 2    tamsulosin (Flomax) 0.4 mg capsule Take 1 Capsule by mouth daily (with dinner). Indications: enlarged prostate with urination problem 90 Capsule 1    testosterone cypionate (DEPOTESTOTERONE CYPIONATE) 200 mg/mL injection 1 mL by IntraMUSCular route every thirty (30) days. Max Daily Amount: 200 mg. Indications: abnormal function and activity of the testis 1 mL 5    lisinopriL (PRINIVIL, ZESTRIL) 20 mg tablet Take 1 tablet by mouth daily. 90 Tablet 2    semaglutide (OZEMPIC) 0.25 mg/0.2 mL (2 mg/1.5 mL) sub-q pen 0.25 mg weekly for 4 weeks and then increase to 0.5 mg weekly (Patient taking differently: 1 mg weekly) 3 Pen 3    sertraline (ZOLOFT) 100 mg tablet Take 1 Tab by mouth two (2) times a day. 180 Tab 1    calcium citrate-vitamin D3 (CITRACAL WITH VITAMIN D MAXIMUM) tablet Take 1 Tab by mouth daily.  loratadine (CLARITIN) 10 mg tablet Take 10 mg by mouth daily.  magnesium oxide (MAG-OX) 400 mg tablet Take 400 mg by mouth daily.  Omeprazole delayed release (PRILOSEC D/R) 20 mg tablet Take 1 Tab by mouth daily.  multivit-min/FA/lycopen/lutein (CENTRUM SILVER MEN PO) Take 1 Tab by mouth daily.  insulin NPH/insulin regular (NOVOLIN 70/30, HUMULIN 70/30) 100 unit/mL (70-30) injection 40 units in morning. 20 units in evening. (Patient taking differently: 30 units in morning. 10 units in evening.) 30 mL 3     History reviewed. No pertinent past medical history. History reviewed. No pertinent surgical history.   No Known Allergies    REVIEW OF SYSTEMS:                                        POSITIVE= bold text  Negative = regular text    General:                     fever, chills, sweats, generalized weakness, weight loss/gain,                                       loss of appetite   Eyes:                           blurred vision, eye pain, loss of vision, double vision  ENT:                            rhinorrhea, pharyngitis   Respiratory:               cough, sputum production, SOB, LEONARD, wheezing, pleuritic pain   Cardiology:                chest pain, palpitations, orthopnea, PND, edema, syncope   Gastrointestinal:       abdominal pain , N/V, diarrhea, dysphagia, constipation, bleeding   Genitourinary:           frequency, urgency, dysuria, hematuria, incontinence   Muskuloskeletal :      arthralgia, myalgia, back pain  Hematology:              easy bruising, nose or gum bleeding, lymphadenopathy   Dermatological:         rash, ulceration, pruritis, color change / jaundice  Endocrine:                 hot flashes or polydipsia   Neurological:             headache, dizziness, confusion, focal weakness, paresthesia,                                      Speech difficulties, memory loss, gait difficulty  Psychological:          Feelings of anxiety, depression, agitation        Social History     Socioeconomic History    Marital status: SINGLE   Tobacco Use    Smoking status: Never Smoker    Smokeless tobacco: Never Used   Vaping Use    Vaping Use: Never used   Substance and Sexual Activity    Alcohol use: Yes     Comment: occ     Family History   Problem Relation Age of Onset    Thyroid Disease Mother     Lung Cancer Father     Heart Disease Father     Diabetes Father     Mult Sclerosis Maternal Grandmother     Diabetes Maternal Grandfather     Cancer Paternal Grandfather        OBJECTIVE:     Visit Vitals  BP (!) 138/90 (BP 1 Location: Left upper arm, BP Patient Position: Sitting, BP Cuff Size: Large adult)   Pulse 80   Temp 98 °F (36.7 °C)   Resp 16   Ht 6' 1\" (1.854 m)   Wt 310 lb 3.2 oz (140.7 kg)   SpO2 99%   BMI 40.93 kg/m²       Constitutional: He appears well nourished, of stated age, and dressed appropriately. Eyes: Sclera anicteric, PERRLA, EOMI  Neck: Supple without lymphadenopathy.  Thyroid normal, No JVD or bruits  Respiratory: Clear to ascultation X5, normal inspiratory effort, no adventitious breath sounds. Cardiovascular: Regular rate and rhythm, no rubs or gallops, PMI not displaced, No thrills, no peripheral edema  Neuro: Non-focal exam, A & O X 3.   Psychiatric: Appropriate affect and demeanor, pleasant and cooperative. Patient's thought content and thought processing appear to be within normal limits. ASSESSMENT/PLAN:     ICD-10-CM ICD-9-CM    1. Essential hypertension  R46 289.6 METABOLIC PANEL, COMPREHENSIVE   2. Mixed hyperlipidemia due to type 2 diabetes mellitus (HCC)  E11.69 250.80 LIPID PANEL    E78.2 272.2    3. Acquired hypothyroidism  E03.9 244.9 TSH 3RD GENERATION   4. Primary insomnia  F51.01 307.42    5. Current moderate episode of major depressive disorder without prior episode (HCC)  F32.1 296.22    6. Anxiety  F41.9 300.00    7. On statin therapy  Z79.899 V58.69      1: We will repeat labs today including: CMP, lipid panel, and TSH.  2: Patient to continue current medications for management of hypertension. Monitor blood pressures regularly. 3: Patient to continue current medications for management of hyperlipidemia and hypothyroidism. 4: Patient to continue use of zolpidem each night for management of insomnia.  5: Patient to continue sertraline for management of depression and anxiety. 6: Patient to continue healthy lifestyle management including: Low-fat/low-cholesterol diet, adequate amounts of fiber water, and exercise as tolerated. 7: Patient to follow-up with me in approximately 4 months, or sooner as needed. Patient states understanding and agrees with plan. Orders Placed This Encounter    LIPID PANEL    METABOLIC PANEL, COMPREHENSIVE    TSH 3RD GENERATION    dietary supplement cap         ATTENTION:   This medical record was transcribed using an electronic medical records system. Although proofread, it may and can contain electronic and spelling errors.   Other human spelling and other errors may be present. Corrections may be executed at a later time. Please feel free to contact us for any clarifications as needed. Follow-up and Dispositions    · Return in about 4 months (around 5/24/2022) for Follow up. Signed,  Umer Sarabia.  Vinod Bolton, MSN APRN FNP-BC

## 2022-01-25 NOTE — PROGRESS NOTES
Thyroid appears to be underfunctioning still. Please inform your endocrinologist about this. Kidney function, liver functions, and electrolytes are normal.    Cholesterol levels are okay, but triglycerides remain elevated. Continue simvastatin as prescribed, and remain watchful of diet avoiding fatty foods and fried foods.

## 2022-02-11 ENCOUNTER — OFFICE VISIT (OUTPATIENT)
Dept: ENDOCRINOLOGY | Age: 57
End: 2022-02-11
Payer: COMMERCIAL

## 2022-02-11 VITALS
SYSTOLIC BLOOD PRESSURE: 142 MMHG | HEART RATE: 88 BPM | BODY MASS INDEX: 39.36 KG/M2 | DIASTOLIC BLOOD PRESSURE: 86 MMHG | WEIGHT: 297 LBS | HEIGHT: 73 IN

## 2022-02-11 DIAGNOSIS — E11.65 TYPE 2 DIABETES MELLITUS WITH HYPERGLYCEMIA, WITH LONG-TERM CURRENT USE OF INSULIN (HCC): Primary | ICD-10-CM

## 2022-02-11 DIAGNOSIS — E29.1 HYPOGONADISM IN MALE: ICD-10-CM

## 2022-02-11 DIAGNOSIS — Z79.4 TYPE 2 DIABETES MELLITUS WITH HYPERGLYCEMIA, WITH LONG-TERM CURRENT USE OF INSULIN (HCC): Primary | ICD-10-CM

## 2022-02-11 DIAGNOSIS — E03.9 ACQUIRED HYPOTHYROIDISM: ICD-10-CM

## 2022-02-11 LAB — HBA1C MFR BLD HPLC: 6.3 %

## 2022-02-11 PROCEDURE — 83036 HEMOGLOBIN GLYCOSYLATED A1C: CPT | Performed by: INTERNAL MEDICINE

## 2022-02-11 PROCEDURE — 99214 OFFICE O/P EST MOD 30 MIN: CPT | Performed by: INTERNAL MEDICINE

## 2022-02-11 RX ORDER — LEVOTHYROXINE SODIUM 100 UG/1
100 TABLET ORAL
Qty: 90 TABLET | Refills: 3 | Status: SHIPPED | OUTPATIENT
Start: 2022-02-11

## 2022-02-11 NOTE — PROGRESS NOTES
This is a 62-year-old white male with a history of type 2 diabetes mellitus diagnosed in 1999 and primary hypothyroidism. Strong family history of diabetes.  His father and his paternal grandfather have or had diabetes. Melanie Chery was found to have diabetes on a routine physical. Melanie Chery was placed on metformin and glipizide and over the last 20 years the regimen has been changed to add insulin.  He says his most recent A1c was 10.1%.  The diabetes is complicated by retinopathy and is currently receiving injections into the right.     At my first visit, I added Ozempic which was titrated up to 0.5 mg weekly which he continues to take. Veverly Skeeters recent A1c of 6.4%.  His A1c today is 6.3%.     Current Diabetes Medication  Metformin 1000 BID  Glipizide 10 mg BID  70/30 insulin 30 AM and 10 units PM  Ozempic 1.0 mg weeky     He says he wakes up in the morning with blood sugars of .  When I saw him for the first time his bedtime blood sugars range from 180-200.  His blood sugars now range between 100-130 at bedtime.  He works as a  and lives with his mother. He is working from home. He also tells me that he recently moved into his mother's house because of the stress of the apartment.  This has caused some consternation in terms of his lifestyle but he is trying to adjust.     Breakfast is cereal or eggs and toast and grape juice and/or milk.  Lunch is typically a sandwich or a [place] taco salad.  He drinks a diet Dorisann Riser can be a [place] salad or hamburger or shrimp and broccoli or stirfry.  Bedtime is usually a rare cookie or cupcake.  He occasionally walks for 20 to 60 minutes.  He does tell me that since starting the Ozempic, he has had decreased appetite and is lost about 15 pounds. Melanie Chery has some symptoms of hypoglycemia when his blood sugar gets into the high 80s but he said no episodes of low blood sugar less than 70.     He was also recently seen by his primary care provider who obtained a TSH of 5.  The patient is on 50 mcg of levothyroxine daily with a TSH that is increased to 5.15 in January. Emanation  Blood pressure 142/86  Pulse 80  Weight 297  BMI 39.2  HEENT unremarkable  Lungs clear  Heart reveals a regular rate and rhythm  Abdomen obese  Extremities remarkable for a left great toe callus with some evidence of underlying ulceration (see picture below)           Diabetic foot exam:     Left Foot:   Visual Exam: normal    Pulse DP: 2+ (normal)   Filament test: normal sensation    Vibratory sensation: normal      Right Foot:   Visual Exam: normal    Pulse DP: 2+ (normal)   Filament test: normal sensation    Vibratory sensation: normal  Pression  1. Type 2 diabetes mellitus with an A1c of 6.3% on combination therapy  2. Hypothyroidism with a TSH of 5.15 on 50 mcg of levothyroxine daily  3. Left great toe ulceration  4. Obesity    Plan:  1. I did not change the antihyperglycemic's  2. I did increase the levothyroxine to 100 mcg daily  3. I did advise him to use a pumice stone and to watch the left great toe closely for erythema or tissue breakdown. 4. I will see him back in 6 months.

## 2022-03-18 PROBLEM — E78.2 MIXED HYPERLIPIDEMIA DUE TO TYPE 2 DIABETES MELLITUS (HCC): Status: ACTIVE | Noted: 2022-01-24

## 2022-03-18 PROBLEM — N62 GYNECOMASTIA, MALE: Status: ACTIVE | Noted: 2021-02-25

## 2022-03-18 PROBLEM — F41.9 ANXIETY: Status: ACTIVE | Noted: 2022-01-24

## 2022-03-18 PROBLEM — Z79.890 LONG-TERM CURRENT USE OF TESTOSTERONE CYPIONATE: Status: ACTIVE | Noted: 2021-09-27

## 2022-03-18 PROBLEM — E55.9 VITAMIN D DEFICIENCY: Status: ACTIVE | Noted: 2021-02-25

## 2022-03-18 PROBLEM — E11.69 MIXED HYPERLIPIDEMIA DUE TO TYPE 2 DIABETES MELLITUS (HCC): Status: ACTIVE | Noted: 2022-01-24

## 2022-03-19 PROBLEM — Z79.899 ON STATIN THERAPY: Status: ACTIVE | Noted: 2021-09-27

## 2022-03-19 PROBLEM — I10 ESSENTIAL HYPERTENSION: Status: ACTIVE | Noted: 2021-02-25

## 2022-03-19 PROBLEM — Z79.4 TYPE 2 DIABETES MELLITUS WITH RETINOPATHY WITHOUT MACULAR EDEMA, WITH LONG-TERM CURRENT USE OF INSULIN (HCC): Status: ACTIVE | Noted: 2021-02-25

## 2022-03-19 PROBLEM — E03.9 ACQUIRED HYPOTHYROIDISM: Status: ACTIVE | Noted: 2021-02-25

## 2022-03-19 PROBLEM — E66.01 MORBID OBESITY WITH BMI OF 40.0-44.9, ADULT (HCC): Status: ACTIVE | Noted: 2021-02-25

## 2022-03-19 PROBLEM — R39.11 BENIGN PROSTATIC HYPERPLASIA WITH URINARY HESITANCY: Status: ACTIVE | Noted: 2021-02-25

## 2022-03-19 PROBLEM — E78.00 HYPERCHOLESTEROLEMIA: Status: ACTIVE | Noted: 2021-02-25

## 2022-03-19 PROBLEM — E29.1 HYPOGONADISM IN MALE: Status: ACTIVE | Noted: 2021-02-25

## 2022-03-19 PROBLEM — Z11.59 NEED FOR HEPATITIS C SCREENING TEST: Status: ACTIVE | Noted: 2021-02-25

## 2022-03-19 PROBLEM — E11.319 TYPE 2 DIABETES MELLITUS WITH RETINOPATHY WITHOUT MACULAR EDEMA, WITH LONG-TERM CURRENT USE OF INSULIN (HCC): Status: ACTIVE | Noted: 2021-02-25

## 2022-03-19 PROBLEM — F32.1 CURRENT MODERATE EPISODE OF MAJOR DEPRESSIVE DISORDER WITHOUT PRIOR EPISODE (HCC): Status: ACTIVE | Noted: 2022-01-24

## 2022-03-19 PROBLEM — N40.1 BENIGN PROSTATIC HYPERPLASIA WITH URINARY HESITANCY: Status: ACTIVE | Noted: 2021-02-25

## 2022-03-20 PROBLEM — F33.1 MODERATE EPISODE OF RECURRENT MAJOR DEPRESSIVE DISORDER (HCC): Status: ACTIVE | Noted: 2021-02-25

## 2022-04-08 DIAGNOSIS — E29.1 HYPOGONADISM IN MALE: ICD-10-CM

## 2022-04-08 DIAGNOSIS — N62 GYNECOMASTIA, MALE: ICD-10-CM

## 2022-04-08 RX ORDER — TESTOSTERONE CYPIONATE 200 MG/ML
INJECTION INTRAMUSCULAR
Qty: 1 ML | Refills: 4 | Status: SHIPPED | OUTPATIENT
Start: 2022-04-08 | End: 2022-09-09

## 2022-04-11 RX ORDER — PHENOL/SODIUM PHENOLATE
20 AEROSOL, SPRAY (ML) MUCOUS MEMBRANE DAILY
Qty: 90 TABLET | Refills: 1 | Status: SHIPPED | OUTPATIENT
Start: 2022-04-11 | End: 2022-04-15 | Stop reason: ALTCHOICE

## 2022-04-11 NOTE — TELEPHONE ENCOUNTER
PCP: Kinjal Alvarado NP    Last appt: 1/24/2022  Future Appointments   Date Time Provider Ervin Dave   5/24/2022  8:00 AM Kinjal Alvarado NP [facility] BS AMB   8/12/2022  8:30 AM Adelita Oswald MD [facility] [address] BS AMB       Requested Prescriptions     Pending Prescriptions Disp Refills    Omeprazole delayed release (PRILOSEC D/R) 20 mg tablet  1     Sig: Take 1 Tablet by mouth daily.          Other Comments:

## 2022-04-11 NOTE — TELEPHONE ENCOUNTER
Requested Prescriptions     Pending Prescriptions Disp Refills    Omeprazole delayed release (PRILOSEC D/R) 20 mg tablet  1     Sig: Take 1 Tablet by mouth daily.        Last Refill: 12/14/20  Next Appointment:5/24/22

## 2022-04-12 RX ORDER — BLOOD-GLUCOSE METER
EACH MISCELLANEOUS
Qty: 1 EACH | Refills: 0 | Status: SHIPPED | OUTPATIENT
Start: 2022-04-12

## 2022-04-12 RX ORDER — LANCETS
EACH MISCELLANEOUS
Qty: 200 EACH | Refills: 3 | Status: SHIPPED | OUTPATIENT
Start: 2022-04-12

## 2022-04-12 RX ORDER — BLOOD SUGAR DIAGNOSTIC
STRIP MISCELLANEOUS
Qty: 200 STRIP | Refills: 3 | Status: SHIPPED | OUTPATIENT
Start: 2022-04-12

## 2022-04-12 RX ORDER — LANCING DEVICE/LANCETS
KIT MISCELLANEOUS
Qty: 1 KIT | Refills: 0 | Status: SHIPPED | OUTPATIENT
Start: 2022-04-12

## 2022-04-12 NOTE — TELEPHONE ENCOUNTER
Requested Prescriptions     Pending Prescriptions Disp Refills    Lancing Device with Lancets (Accu-Chek FastClix Lancing Dev) kit 1 Kit 0     Sig: Monitor blood sugar twice daily.  Blood-Glucose Meter (Accu-Chek Guide Glucose Meter) misc 1 Each 0     Sig: Monitor blood sugar twice daily.  glucose blood VI test strips (Accu-Chek Guide test strips) strip 200 Strip 3     Sig: Monitor blood sugar twice daily.  lancets (Accu-Chek Fastclix Lancet Drum) misc 200 Each 3     Sig: Monitor blood sugar twice daily.

## 2022-04-15 ENCOUNTER — TELEPHONE (OUTPATIENT)
Dept: INTERNAL MEDICINE CLINIC | Age: 57
End: 2022-04-15

## 2022-04-15 DIAGNOSIS — K21.9 GASTROESOPHAGEAL REFLUX DISEASE WITHOUT ESOPHAGITIS: Primary | ICD-10-CM

## 2022-04-15 RX ORDER — OMEPRAZOLE 20 MG/1
20 CAPSULE, DELAYED RELEASE ORAL DAILY
Qty: 90 CAPSULE | Refills: 1 | Status: SHIPPED | OUTPATIENT
Start: 2022-04-15 | End: 2022-10-12

## 2022-04-15 NOTE — TELEPHONE ENCOUNTER
[id number] would like a call back to clarify Omeprazole 20 mg. She needs to know if you want tablets or capsules. She states the tablets are not covered by his insurance. Please call.     PATY [phone]

## 2022-05-24 ENCOUNTER — OFFICE VISIT (OUTPATIENT)
Dept: INTERNAL MEDICINE CLINIC | Age: 57
End: 2022-05-24
Payer: COMMERCIAL

## 2022-05-24 VITALS
HEART RATE: 80 BPM | OXYGEN SATURATION: 98 % | BODY MASS INDEX: 39.15 KG/M2 | SYSTOLIC BLOOD PRESSURE: 126 MMHG | RESPIRATION RATE: 18 BRPM | WEIGHT: 295.4 LBS | DIASTOLIC BLOOD PRESSURE: 78 MMHG | HEIGHT: 73 IN | TEMPERATURE: 97.6 F

## 2022-05-24 DIAGNOSIS — E03.9 ACQUIRED HYPOTHYROIDISM: ICD-10-CM

## 2022-05-24 DIAGNOSIS — I10 ESSENTIAL HYPERTENSION: ICD-10-CM

## 2022-05-24 DIAGNOSIS — F33.1 MODERATE EPISODE OF RECURRENT MAJOR DEPRESSIVE DISORDER (HCC): ICD-10-CM

## 2022-05-24 DIAGNOSIS — K21.9 GASTROESOPHAGEAL REFLUX DISEASE WITHOUT ESOPHAGITIS: ICD-10-CM

## 2022-05-24 DIAGNOSIS — E11.69 MIXED HYPERLIPIDEMIA DUE TO TYPE 2 DIABETES MELLITUS (HCC): Primary | ICD-10-CM

## 2022-05-24 DIAGNOSIS — Z79.899 ON STATIN THERAPY: ICD-10-CM

## 2022-05-24 DIAGNOSIS — E11.319 TYPE 2 DIABETES MELLITUS WITH RETINOPATHY WITHOUT MACULAR EDEMA, WITH LONG-TERM CURRENT USE OF INSULIN, UNSPECIFIED LATERALITY, UNSPECIFIED RETINOPATHY SEVERITY (HCC): ICD-10-CM

## 2022-05-24 DIAGNOSIS — Z79.4 TYPE 2 DIABETES MELLITUS WITH RETINOPATHY WITHOUT MACULAR EDEMA, WITH LONG-TERM CURRENT USE OF INSULIN, UNSPECIFIED LATERALITY, UNSPECIFIED RETINOPATHY SEVERITY (HCC): ICD-10-CM

## 2022-05-24 DIAGNOSIS — E78.2 MIXED HYPERLIPIDEMIA DUE TO TYPE 2 DIABETES MELLITUS (HCC): Primary | ICD-10-CM

## 2022-05-24 DIAGNOSIS — E66.01 SEVERE OBESITY (BMI 35.0-39.9) WITH COMORBIDITY (HCC): ICD-10-CM

## 2022-05-24 PROCEDURE — 99214 OFFICE O/P EST MOD 30 MIN: CPT | Performed by: NURSE PRACTITIONER

## 2022-05-24 PROCEDURE — 3044F HG A1C LEVEL LT 7.0%: CPT | Performed by: NURSE PRACTITIONER

## 2022-05-24 NOTE — PROGRESS NOTES
Chief Complaint   Patient presents with    Diabetes     4M follow up    Hypertension     4M follow up       SUBJECTIVE:    Jennifer Castro is a 49 y.o. male who is here today for a follow up appointment regarding his type 2 diabetes with retinopathy and mixed hyperlipidemia, severe obesity with comorbidity, major depressive disorder, hypertension, hypothyroidism, GERD, and long-term use of statin therapy. He states he is feeling fairly well overall, and denies any new or acute complaints at this time. Patient continues to be followed by endocrinology for management of his type 2 diabetes. He states he continues to take his medications as prescribed, and denies any adverse side effects. He is tolerating these well, and denies any hypoglycemic reactions with use of insulin. His last hemoglobin A1c demonstrates adequate control of his diabetes overall. He continues to take his medication for management of his hypertension which appears to be well controlled. He continues to take simvastatin nightly for management of his cholesterol. He denies any chest pain, chest pressure, shortness of breath, dizziness, blurred vision, palpitations, or syncope episodes. He walks occasionally, but does not exercise rigorously. He feels his depression is currently stable on his sertraline, and continues to take this daily. He is sleeping well overall with use of trazodone. He continues to use omeprazole for management of his GERD which is well controlled. Current Outpatient Medications   Medication Sig Dispense Refill    Ozempic 1 mg/dose (4 mg/3 mL) pnij INJECT 1 MG BY SUBCUTANEOUS ROUTE EVERY SEVEN (7) DAYS. 3 Each 3    simvastatin (ZOCOR) 40 mg tablet Take 1 tablet by mouth daily. 90 Tablet 1    metFORMIN (GLUCOPHAGE) 1,000 mg tablet Take 1 tablet by mouth twice daily. 180 Tablet 1    omeprazole (PRILOSEC) 20 mg capsule Take 1 Capsule by mouth daily.  90 Capsule 1    Lancing Device with Lancets (Accu-Chek FastClix Lancing Dev) kit Monitor blood sugar twice daily. 1 Kit 0    Blood-Glucose Meter (Accu-Chek Guide Glucose Meter) misc Monitor blood sugar twice daily. 1 Each 0    glucose blood VI test strips (Accu-Chek Guide test strips) strip Monitor blood sugar twice daily. 200 Strip 3    lancets (Accu-Chek Fastclix Lancet Drum) misc Monitor blood sugar twice daily. 200 Each 3    testosterone cypionate (DEPOTESTOTERONE CYPIONATE) 200 mg/mL injection INJECT 1ML INTO MUSCLE ONCE EVERY 30 DAYS - PREFILLED SYRINGES 1 mL 4    tamsulosin (FLOMAX) 0.4 mg capsule TAKE 1 CAPSULE BY MOUTH DAILY (WITH DINNER). INDICATIONS: ENLARGED PROSTATE WITH URINATION PROBLEM 90 Capsule 1    lisinopriL (PRINIVIL, ZESTRIL) 20 mg tablet Take 1 tablet by mouth daily. 90 Tablet 1    sertraline (ZOLOFT) 100 mg tablet Take 1 tablet by mouth twice daily. 180 Tablet 1    traZODone (DESYREL) 50 mg tablet Take 1 tablet by mouth at bedtime. 90 Tablet 1    glipiZIDE (GLUCOTROL) 10 mg tablet Take 1 tablet by mouth twice daily. 180 Tablet 1    levothyroxine (SYNTHROID) 100 mcg tablet Take 1 Tablet by mouth Daily (before breakfast). 90 Tablet 3    insulin NPH/insulin regular (NovoLIN 70/30 U-100 Insulin) 100 unit/mL (70-30) injection 30 units in morning. 10 units in evening. 30 mL 1    dietary supplement cap Take  by mouth. CBD capsules      zolpidem (AMBIEN) 5 mg tablet Take 1 Tablet by mouth nightly as needed for Sleep. Max Daily Amount: 5 mg. Indications: difficulty falling asleep 30 Tablet 2    calcium citrate-vitamin D3 (CITRACAL WITH VITAMIN D MAXIMUM) tablet Take 1 Tab by mouth daily.  loratadine (CLARITIN) 10 mg tablet Take 10 mg by mouth daily.  magnesium oxide (MAG-OX) 400 mg tablet Take 400 mg by mouth daily.  multivit-min/FA/lycopen/lutein (CENTRUM SILVER MEN PO) Take 1 Tab by mouth daily. History reviewed. No pertinent past medical history. History reviewed. No pertinent surgical history.   No Known Allergies    REVIEW OF SYSTEMS:                                        POSITIVE= bold text  Negative = regular text    General:                     fever, chills, sweats, generalized weakness, weight loss/gain,                                       loss of appetite   Eyes:                           blurred vision, eye pain, loss of vision, double vision  ENT:                            rhinorrhea, pharyngitis   Respiratory:               cough, sputum production, SOB, LEONARD, wheezing, pleuritic pain   Cardiology:                chest pain, palpitations, orthopnea, PND, edema, syncope   Gastrointestinal:       abdominal pain , N/V, diarrhea, dysphagia, constipation, bleeding   Genitourinary:           frequency, urgency, dysuria, hematuria, incontinence   Muskuloskeletal :      arthralgia, myalgia, back pain  Hematology:              easy bruising, nose or gum bleeding, lymphadenopathy   Dermatological:         rash, ulceration, pruritis, color change / jaundice  Endocrine:                 hot flashes or polydipsia   Neurological:             headache, dizziness, confusion, focal weakness, paresthesia,                                      Speech difficulties, memory loss, gait difficulty  Psychological:          Feelings of anxiety, depression, agitation        Social History     Socioeconomic History    Marital status: SINGLE   Tobacco Use    Smoking status: Never Smoker    Smokeless tobacco: Never Used   Vaping Use    Vaping Use: Never used   Substance and Sexual Activity    Alcohol use: Yes     Comment: occ     Family History   Problem Relation Age of Onset    Thyroid Disease Mother     Lung Cancer Father     Heart Disease Father     Diabetes Father     Mult Sclerosis Maternal Grandmother     Diabetes Maternal Grandfather     Cancer Paternal Grandfather        OBJECTIVE:     Visit Vitals  /78 (BP 1 Location: Left arm, BP Patient Position: Sitting, BP Cuff Size: Adult)   Pulse 80   Temp 97.6 °F (36.4 °C) (Oral)   Resp 18   Ht 6' 1\" (1.854 m)   Wt 295 lb 6.4 oz (134 kg)   SpO2 98%   BMI 38.97 kg/m²       Constitutional: He appears well nourished, of stated age, and dressed appropriately. Eyes: Sclera anicteric, PERRLA, EOMI  Neck: Supple without lymphadenopathy. Thyroid normal, No JVD or bruits  Respiratory: Clear to ascultation X5, normal inspiratory effort, no adventitious breath sounds. Cardiovascular: Regular rate and rhythm, no rubs or gallops, PMI not displaced, No thrills, no peripheral edema  Neuro: Non-focal exam, A & O X 3.   Psychiatric: Appropriate affect and demeanor, pleasant and cooperative. Patient's thought content and thought processing appear to be within normal limits. ASSESSMENT/PLAN:     ICD-10-CM ICD-9-CM    1. Mixed hyperlipidemia due to type 2 diabetes mellitus (HCC)  E11.69 250.80 LIPID PANEL    M81.7 226.2 METABOLIC PANEL, COMPREHENSIVE   2. Type 2 diabetes mellitus with retinopathy without macular edema, with long-term current use of insulin, unspecified laterality, unspecified retinopathy severity ([facility]  E11.319 250.50 MICROALBUMIN, UR, RAND W/ MICROALB/CREAT RATIO    Z79.4 362.01      V58.67    3. Severe obesity (BMI 35.0-39. 9) with comorbidity ([facility]  E66.01 278.01    4. Moderate episode of recurrent major depressive disorder (HCC)  F33.1 296.32    5. Essential hypertension  I10 401.9 MICROALBUMIN, UR, RAND W/ MICROALB/CREAT RATIO   6. Acquired hypothyroidism  E03.9 244.9 TSH 3RD GENERATION      T4, FREE   7. Gastroesophageal reflux disease without esophagitis  K21.9 530.81    8. On statin therapy  P90.445 M95.14 METABOLIC PANEL, COMPREHENSIVE     1: We will repeat labs today including: Urine microalbumin, TSH, free T4, lipid panel, and CMP.  2: I have discussed option of increasing Ozempic with patient for improved weight loss. Patient states he will discuss this with endocrinologist.  3: Patient to continue current medications for management of type 2 diabetes. Continue medication for management of hypertension, mixed lipidemia, and hypothyroidism. Continue to monitor blood sugars. 4: Continue healthy lifestyle management and appropriate dietary intake. Exercise as tolerated. 5: Continue all other medications and supplements as desired. 6: Follow-up with me in approximately 6 months, or sooner as needed. Patient states understanding and agrees with plan. Orders Placed This Encounter    MICROALBUMIN, UR, RAND W/ MICROALB/CREAT RATIO    TSH 3RD GENERATION    T4, FREE    LIPID PANEL    METABOLIC PANEL, COMPREHENSIVE         ATTENTION:   This medical record was transcribed using an electronic medical records system. Although proofread, it may and can contain electronic and spelling errors. Other human spelling and other errors may be present. Corrections may be executed at a later time. Please feel free to contact us for any clarifications as needed. Follow-up and Dispositions    · Return in about 6 months (around 11/24/2022) for Follow up with fasting labs. Signed,  Tj Salazar.  Kelly Mann, MSN APRN FNP-BC

## 2022-05-24 NOTE — PROGRESS NOTES
Matilde Wilson is a 49 y.o. male     Chief Complaint   Patient presents with    Diabetes     4M follow up    Hypertension     4M follow up       Visit Vitals  /78 (BP 1 Location: Left arm, BP Patient Position: Sitting, BP Cuff Size: Adult)   Pulse 80   Temp 97.6 °F (36.4 °C) (Oral)   Resp 18   Ht 6' 1\" (1.854 m)   Wt 295 lb 6.4 oz (134 kg)   SpO2 98%   BMI 38.97 kg/m²       Health Maintenance Due   Topic Date Due    Eye Exam Retinal or Dilated  Never done    DTaP/Tdap/Td series (1 - Tdap) Never done    Colorectal Cancer Screening Combo  Never done    Pneumococcal 0-64 years (2 - PCV) 06/01/2014    Shingrix Vaccine Age 50> (1 of 2) Never done    Depression Monitoring  02/25/2022    MICROALBUMIN Q1  02/25/2022         1. \"Have you been to the ER, urgent care clinic since your last visit? Hospitalized since your last visit? \" No    2. \"Have you seen or consulted any other health care providers outside of the [address] [place] [address] since your last visit? \" No     3. For patients aged 39-70: Has the patient had a colonoscopy / FIT/ Cologuard? No      If the patient is female:    4. For patients aged 41-77: Has the patient had a mammogram within the past 2 years? NA - based on age or sex      11. For patients aged 21-65: Has the patient had a pap smear?  NA - based on age or sex

## 2022-05-25 LAB
ALBUMIN SERPL-MCNC: 4.8 G/DL (ref 3.8–4.9)
ALBUMIN/CREAT UR: 10 MG/G CREAT (ref 0–29)
ALBUMIN/GLOB SERPL: 2.4 {RATIO} (ref 1.2–2.2)
ALP SERPL-CCNC: 101 IU/L (ref 44–121)
ALT SERPL-CCNC: 25 IU/L (ref 0–44)
AST SERPL-CCNC: 20 IU/L (ref 0–40)
BILIRUB SERPL-MCNC: 0.3 MG/DL (ref 0–1.2)
BUN SERPL-MCNC: 13 MG/DL (ref 6–24)
BUN/CREAT SERPL: 17 (ref 9–20)
CALCIUM SERPL-MCNC: 9.6 MG/DL (ref 8.7–10.2)
CHLORIDE SERPL-SCNC: 101 MMOL/L (ref 96–106)
CHOLEST SERPL-MCNC: 155 MG/DL (ref 100–199)
CO2 SERPL-SCNC: 22 MMOL/L (ref 20–29)
CREAT SERPL-MCNC: 0.75 MG/DL (ref 0.76–1.27)
CREAT UR-MCNC: 106.3 MG/DL
EGFR: 107 ML/MIN/1.73
GLOBULIN SER CALC-MCNC: 2 G/DL (ref 1.5–4.5)
GLUCOSE SERPL-MCNC: 138 MG/DL (ref 65–99)
HDLC SERPL-MCNC: 34 MG/DL
LDLC SERPL CALC-MCNC: 73 MG/DL (ref 0–99)
MICROALBUMIN UR-MCNC: 10.9 UG/ML
POTASSIUM SERPL-SCNC: 4.6 MMOL/L (ref 3.5–5.2)
PROT SERPL-MCNC: 6.8 G/DL (ref 6–8.5)
SODIUM SERPL-SCNC: 142 MMOL/L (ref 134–144)
T4 FREE SERPL-MCNC: 1.48 NG/DL (ref 0.82–1.77)
TRIGL SERPL-MCNC: 300 MG/DL (ref 0–149)
TSH SERPL DL<=0.005 MIU/L-ACNC: 2.57 UIU/ML (ref 0.45–4.5)
VLDLC SERPL CALC-MCNC: 48 MG/DL (ref 5–40)

## 2022-05-25 RX ORDER — FENOFIBRATE 160 MG/1
160 TABLET ORAL DAILY
Qty: 90 TABLET | Refills: 1 | Status: SHIPPED | OUTPATIENT
Start: 2022-05-25

## 2022-05-25 NOTE — PROGRESS NOTES
Metabolic panel is acceptable. Liver functions and electrolytes are normal.  Kidney function is normal.    Cholesterol levels are okay but triglycerides remain elevated. For this I am going to add additional medication to help focus on that triglycerides. Continue simvastatin for your cholesterol, and start fenofibrate daily for your triglycerides. This has been sent to your pharmacy. Thyroid appears in normal range at this time.

## 2022-08-12 ENCOUNTER — OFFICE VISIT (OUTPATIENT)
Dept: ENDOCRINOLOGY | Age: 57
End: 2022-08-12
Payer: COMMERCIAL

## 2022-08-12 VITALS
BODY MASS INDEX: 38.43 KG/M2 | WEIGHT: 290 LBS | HEART RATE: 84 BPM | SYSTOLIC BLOOD PRESSURE: 137 MMHG | DIASTOLIC BLOOD PRESSURE: 73 MMHG | HEIGHT: 73 IN

## 2022-08-12 DIAGNOSIS — E11.65 TYPE 2 DIABETES MELLITUS WITH HYPERGLYCEMIA, WITH LONG-TERM CURRENT USE OF INSULIN (HCC): Primary | ICD-10-CM

## 2022-08-12 DIAGNOSIS — E03.9 ACQUIRED HYPOTHYROIDISM: ICD-10-CM

## 2022-08-12 DIAGNOSIS — Z79.4 TYPE 2 DIABETES MELLITUS WITH HYPERGLYCEMIA, WITH LONG-TERM CURRENT USE OF INSULIN (HCC): Primary | ICD-10-CM

## 2022-08-12 DIAGNOSIS — E29.1 HYPOGONADISM IN MALE: ICD-10-CM

## 2022-08-12 PROCEDURE — 99214 OFFICE O/P EST MOD 30 MIN: CPT | Performed by: INTERNAL MEDICINE

## 2022-08-12 PROCEDURE — 3044F HG A1C LEVEL LT 7.0%: CPT | Performed by: INTERNAL MEDICINE

## 2022-08-12 NOTE — PROGRESS NOTES
This is a 77-year-old white male with a history of type 2 diabetes mellitus diagnosed in 1999 and primary hypothyroidism. Strong family history of diabetes. His father and his paternal grandfather have or had diabetes. He was found to have diabetes on a routine physical.  He was placed on metformin and glipizide and over the last 20 years the regimen has been changed to add insulin. He says his most recent A1c was 10.1%. The diabetes is complicated by retinopathy and is currently receiving injections into the right. At my first visit, I added Ozempic which was titrated up to 1.0 mg weekly which he continues to take. He had a recent A1c of 6.4%. His A1c today is 6.3%. Current Diabetes Medication  Metformin 1000 BID  Glipizide 10 mg BID  70/30 insulin 30 AM and 10 units PM  Ozempic 1.0 mg weeky     He says he wakes up in the morning with blood sugars of . When I saw him for the first time his bedtime blood sugars range from 180-200. His blood sugars now range between 100-130 at bedtime. He works as a  and lives with his mother. He is working from home. He also tells me that he recently moved into his mother's house because of the stress of the apartment. This has caused some consternation in terms of his lifestyle but he is trying to adjust.     Breakfast is cereal or eggs and toast and grape juice and/or milk. Lunch is typically a sandwich or a [place] taco salad. He drinks a diet Pepsi. Dinner can be a [place] salad or hamburger or shrimp and broccoli or stirfry. Bedtime is usually a rare cookie or cupcake. He occasionally walks for 20 to 60 minutes. He does tell me that since starting the Ozempic, he has had decreased appetite and is lost about 15 pounds. He has some symptoms of hypoglycemia when his blood sugar gets into the high 80s but he said no episodes of low blood sugar less than 70.      He was also recently seen by his primary care provider who obtained a TSH of 2.5.    Examination  Blood pressure 137/73  Pulse 80  Weight 290 (he has lost 20 pounds)  BMI 38.3  HEENT unremarkable  Lungs clear  Heart reveals a regular rate and rhythm  Abdomen obese  Extremities unremarkable the callus on the right great toe is significantly improved with home care. Diabetic foot exam:     Left Foot:   Visual Exam: normal    Pulse DP: 2+ (normal)   Filament test: normal sensation    Vibratory sensation: normal      Right Foot:   Visual Exam: There is a callus on the medial aspect of the great toe which is significantly reduced from his last visit   Pulse DP: 2+ (normal)   Filament test: normal sensation    Vibratory sensation: normal    Impression  1. Type 2 diabetes mellitus with excellent glucose control on combination therapy  2. Primary hypothyroidism with a normal TSH following an increase in levothyroxine to 100 mcg daily  3. Obesity with some response to the Ozempic and dietary changes    Recommendation  1. Although his insurer had suggested the possibility of increasing his Ozempic to 2 mg, he would like to stay where he is continue the strategy because its working and he is tolerating everything without complication  2. I will see him back in 6 months.

## 2022-09-09 DIAGNOSIS — E29.1 HYPOGONADISM IN MALE: ICD-10-CM

## 2022-09-09 DIAGNOSIS — N62 GYNECOMASTIA, MALE: ICD-10-CM

## 2022-09-09 RX ORDER — TESTOSTERONE CYPIONATE 200 MG/ML
INJECTION INTRAMUSCULAR
Qty: 1 ML | Refills: 3 | Status: SHIPPED | OUTPATIENT
Start: 2022-09-09

## 2022-11-29 ENCOUNTER — OFFICE VISIT (OUTPATIENT)
Dept: INTERNAL MEDICINE CLINIC | Age: 57
End: 2022-11-29
Payer: COMMERCIAL

## 2022-11-29 VITALS
TEMPERATURE: 98.2 F | BODY MASS INDEX: 40 KG/M2 | HEIGHT: 73 IN | WEIGHT: 301.8 LBS | HEART RATE: 80 BPM | SYSTOLIC BLOOD PRESSURE: 132 MMHG | RESPIRATION RATE: 16 BRPM | OXYGEN SATURATION: 98 % | DIASTOLIC BLOOD PRESSURE: 78 MMHG

## 2022-11-29 DIAGNOSIS — E11.69 MIXED HYPERLIPIDEMIA DUE TO TYPE 2 DIABETES MELLITUS (HCC): ICD-10-CM

## 2022-11-29 DIAGNOSIS — N40.1 BENIGN PROSTATIC HYPERPLASIA WITH URINARY HESITANCY: ICD-10-CM

## 2022-11-29 DIAGNOSIS — E78.2 MIXED HYPERLIPIDEMIA DUE TO TYPE 2 DIABETES MELLITUS (HCC): ICD-10-CM

## 2022-11-29 DIAGNOSIS — I10 ESSENTIAL HYPERTENSION: Primary | ICD-10-CM

## 2022-11-29 DIAGNOSIS — F33.1 MODERATE EPISODE OF RECURRENT MAJOR DEPRESSIVE DISORDER (HCC): ICD-10-CM

## 2022-11-29 DIAGNOSIS — Z79.899 ON STATIN THERAPY: ICD-10-CM

## 2022-11-29 DIAGNOSIS — R39.11 BENIGN PROSTATIC HYPERPLASIA WITH URINARY HESITANCY: ICD-10-CM

## 2022-11-29 DIAGNOSIS — K21.9 GASTROESOPHAGEAL REFLUX DISEASE WITHOUT ESOPHAGITIS: ICD-10-CM

## 2022-11-29 DIAGNOSIS — E03.9 ACQUIRED HYPOTHYROIDISM: ICD-10-CM

## 2022-11-29 DIAGNOSIS — Z12.5 SCREENING FOR PROSTATE CANCER: ICD-10-CM

## 2022-11-29 PROCEDURE — 3078F DIAST BP <80 MM HG: CPT | Performed by: NURSE PRACTITIONER

## 2022-11-29 PROCEDURE — 3074F SYST BP LT 130 MM HG: CPT | Performed by: NURSE PRACTITIONER

## 2022-11-29 PROCEDURE — 99214 OFFICE O/P EST MOD 30 MIN: CPT | Performed by: NURSE PRACTITIONER

## 2022-11-29 PROCEDURE — 3044F HG A1C LEVEL LT 7.0%: CPT | Performed by: NURSE PRACTITIONER

## 2022-11-29 NOTE — PROGRESS NOTES
Arthuro Duane is a 49 y.o. male     Chief Complaint   Patient presents with    Diabetes     6M follow up    Hypertension     6M follow up       Visit Vitals  /78 (BP 1 Location: Left arm, BP Patient Position: Sitting, BP Cuff Size: Adult)   Pulse 80   Temp 98.2 °F (36.8 °C) (Oral)   Resp 16   Ht 6' 1\" (1.854 m)   Wt 301 lb 12.8 oz (136.9 kg)   SpO2 98%   BMI 39.82 kg/m²       Health Maintenance Due   Topic Date Due    Eye Exam Retinal or Dilated  Never done    Hepatitis B Vaccine (1 of 3 - Risk 3-dose series) Never done    DTaP/Tdap/Td series (1 - Tdap) Never done    Colorectal Cancer Screening Combo  Never done    Shingrix Vaccine Age 50> (1 of 2) Never done         1. \"Have you been to the ER, urgent care clinic since your last visit? Hospitalized since your last visit? \" No    2. \"Have you seen or consulted any other health care providers outside of the [address] [place] [address] since your last visit? \" No     3. For patients aged 39-70: Has the patient had a colonoscopy / FIT/ Cologuard? No      If the patient is female:    4. For patients aged 41-77: Has the patient had a mammogram within the past 2 years? NA - based on age or sex      11. For patients aged 21-65: Has the patient had a pap smear?  NA - based on age or sex

## 2022-11-29 NOTE — PROGRESS NOTES
Chief Complaint   Patient presents with    Diabetes     6M follow up    Hypertension     6M follow up       SUBJECTIVE:    Bennie Judge is a 49 y.o. male who is here today for a follow up appointment regarding current medical conditions which include: Essential hypertension, major depressive disorder, acquired hypothyroidism, mixed hyperlipidemia due to type 2 diabetes, GERD, BPH, and long-term use of statin therapy. The patient continues to take medication for management of his hypertension. His blood pressure appears well controlled at this time. He denies any adverse side effects of medication. He is also on a combination of simvastatin and fenofibrate for management of his mixed hyperlipidemia. He is tolerating these well. He denies any recent episodes of chest pain, chest pressure, shortness of breath, headaches, dizziness, blurred vision, palpitations, or syncope episodes. He is currently being managed for hypothyroidism and uses levothyroxine for treatment of this. He is tolerating the medication well without adverse side effects. He also suffers from GERD and is taking omeprazole daily for this. His symptoms are well controlled. He has a history of BPH and is currently on tamsulosin daily. Additionally, the patient has been having ongoing issues with recurrent major depression. He states he has been seeing a therapist for some time now, but was recently notified that his therapist was taking a leave of absence. He hopes to return sometime in February. However, the patient is concerned about establishing with a new therapist, and is likely going to wait until his current therapist returns to resume meetings. He continues to take sertraline 100 mg twice a day which she feels is beneficial.  He denies any need for augmentation or addition to his current medication regimen.     Current Outpatient Medications   Medication Sig Dispense Refill    insulin NPH/insulin regular (NovoLIN 70/30 U-100 Insulin) 100 unit/mL (70-30) injection INJECT 30 UNITS SUBCUTANEOUSLY EVERY MORNING AND 10 UNITS EVERY EVENING 30 mL 3    omeprazole (PRILOSEC) 20 mg capsule Take 1 capsule by mouth once daily. 90 Capsule 1    simvastatin (ZOCOR) 40 mg tablet Take 1 tablet by mouth daily. 90 Tablet 1    metFORMIN (GLUCOPHAGE) 1,000 mg tablet Take 1 tablet by mouth twice daily. 180 Tablet 1    Ozempic 1 mg/dose (4 mg/3 mL) pnij INJECT 1 MG BY SUBCUTANEOUS ROUTE EVERY SEVEN (7) DAYS. 3 Each 2    tamsulosin (FLOMAX) 0.4 mg capsule TAKE 1 CAPSULE BY MOUTH DAILY WITH DINNER 90 Capsule 1    testosterone cypionate (DEPOTESTOTERONE CYPIONATE) 200 mg/mL injection INJECT 1ML INTO MUSCLE ONCE EVERY 30 DAYS - PREFILLED SYRINGES 1 mL 3    glipiZIDE (GLUCOTROL) 10 mg tablet Take 1 tablet by mouth twice daily. 180 Tablet 1    sertraline (ZOLOFT) 100 mg tablet Take 1 tablet by mouth twice daily. 180 Tablet 1    traZODone (DESYREL) 50 mg tablet Take 1 tablet by mouth at bedtime. 90 Tablet 1    lisinopriL (PRINIVIL, ZESTRIL) 20 mg tablet Take 1 tablet by mouth daily. 90 Tablet 1    fenofibrate (LOFIBRA) 160 mg tablet Take 1 Tablet by mouth daily. Indications: high cholesterol and high triglycerides 90 Tablet 1    Lancing Device with Lancets (Accu-Chek FastClix Lancing Dev) kit Monitor blood sugar twice daily. 1 Kit 0    Blood-Glucose Meter (Accu-Chek Guide Glucose Meter) misc Monitor blood sugar twice daily. 1 Each 0    glucose blood VI test strips (Accu-Chek Guide test strips) strip Monitor blood sugar twice daily. 200 Strip 3    lancets (Accu-Chek Fastclix Lancet Drum) misc Monitor blood sugar twice daily. 200 Each 3    levothyroxine (SYNTHROID) 100 mcg tablet Take 1 Tablet by mouth Daily (before breakfast). 90 Tablet 3    dietary supplement cap Take  by mouth. CBD capsules      calcium citrate-vitamin D3 (CITRACAL WITH VITAMIN D MAXIMUM) tablet Take 1 Tab by mouth daily. loratadine (CLARITIN) 10 mg tablet Take 10 mg by mouth daily. magnesium oxide (MAG-OX) 400 mg tablet Take 400 mg by mouth daily. multivit-min/FA/lycopen/lutein (CENTRUM SILVER MEN PO) Take 1 Tab by mouth daily. zolpidem (AMBIEN) 5 mg tablet Take 1 Tablet by mouth nightly as needed for Sleep. Max Daily Amount: 5 mg. Indications: difficulty falling asleep (Patient not taking: Reported on 11/29/2022) 30 Tablet 2     History reviewed. No pertinent past medical history. History reviewed. No pertinent surgical history.   No Known Allergies    REVIEW OF SYSTEMS:                                        POSITIVE= bold text  Negative = regular text    General:                     fever, chills, sweats, generalized weakness, weight loss/gain,                                       loss of appetite   Eyes:                           blurred vision, eye pain, loss of vision, double vision  ENT:                            rhinorrhea, pharyngitis   Respiratory:               cough, sputum production, SOB, LEONARD, wheezing, pleuritic pain   Cardiology:                chest pain, palpitations, orthopnea, PND, edema, syncope   Gastrointestinal:       abdominal pain , N/V, diarrhea, dysphagia, constipation, bleeding   Genitourinary:           frequency, urgency, dysuria, hematuria, incontinence   Muskuloskeletal :      arthralgia, myalgia, back pain  Hematology:              easy bruising, nose or gum bleeding, lymphadenopathy   Dermatological:         rash, ulceration, pruritis, color change / jaundice  Endocrine:                 hot flashes or polydipsia   Neurological:             headache, dizziness, confusion, focal weakness, paresthesia,                                      Speech difficulties, memory loss, gait difficulty  Psychological:          Feelings of anxiety, depression, agitation        Social History     Socioeconomic History    Marital status: SINGLE   Tobacco Use    Smoking status: Never    Smokeless tobacco: Never   Vaping Use    Vaping Use: Never used   Substance and Sexual Activity    Alcohol use: Yes     Comment: occ     Social Determinants of Health     Financial Resource Strain: Low Risk     Difficulty of Paying Living Expenses: Not hard at all   Food Insecurity: No Food Insecurity    Worried About Running Out of Food in the Last Year: Never true    Ran Out of Food in the Last Year: Never true     Family History   Problem Relation Age of Onset    Thyroid Disease Mother     Lung Cancer Father     Heart Disease Father     Diabetes Father     Mult Sclerosis Maternal Grandmother     Diabetes Maternal Grandfather     Cancer Paternal Grandfather        OBJECTIVE:     Visit Vitals  /78 (BP 1 Location: Left arm, BP Patient Position: Sitting, BP Cuff Size: Adult)   Pulse 80   Temp 98.2 °F (36.8 °C) (Oral)   Resp 16   Ht 6' 1\" (1.854 m)   Wt 301 lb 12.8 oz (136.9 kg)   SpO2 98%   BMI 39.82 kg/m²       Constitutional: He appears well nourished, of stated age, and dressed appropriately. Eyes: Sclera anicteric, PERRLA, EOMI  Neck: Supple without lymphadenopathy. Respiratory: Clear to ascultation X5, normal inspiratory effort, no adventitious breath sounds. Cardiovascular: Regular rate and rhythm, no rubs or gallops, PMI not displaced, No thrills. Neuro: Non-focal exam, A & O X 3.   Psychiatric: Mildly flat affect and demeanor, but otherwise pleasant and cooperative. Patient's thought content and thought processing appear to be within normal limits. ASSESSMENT/PLAN:     ICD-10-CM ICD-9-CM    1. Essential hypertension  A25 530.9 METABOLIC PANEL, COMPREHENSIVE      2. Moderate episode of recurrent major depressive disorder (HCC)  F33.1 296.32       3. Mixed hyperlipidemia due to type 2 diabetes mellitus (HCC)  E11.69 250.80 LIPID PANEL    E78.2 272.2       4. Gastroesophageal reflux disease without esophagitis  K21.9 530.81       5. Benign prostatic hyperplasia with urinary hesitancy  N40.1 600.01 PSA SCREENING (SCREENING)    R39.11 788.64       6.  Acquired hypothyroidism E03.9 244.9 TSH 3RD GENERATION      T4, FREE      7. Screening for prostate cancer  Z12.5 V76.44 PSA SCREENING (SCREENING)      8. On statin therapy  Z79.899 V58.69 CK        1: Labs ordered today include: TSH, free T4, CK, CMP, lipid panel, and screening PSA. 2: Patient to continue current dose of sertraline for management of major depression. Follow-up with therapist as planned. 3: Continue current medications for management of type 2 diabetes. Patient being followed by Dr. Thornton/endocrinology. 4: Patient to continue simvastatin and fenofibrate for management of mixed hyperlipidemia. Patient tolerating well.  5: Patient to continue current medication for management of hypertension. Blood pressure appears well controlled at this time. 6: Patient to continue tamsulosin for management of BPH.  7: Continue levothyroxine at current dose for management of hypothyroidism. Patient stable. 8: Encouraged patient to continue healthy lifestyle management including: Regular exercise patterns, adequate amounts of fiber water, and low-fat/low-cholesterol diet. Avoid concentrated sweets and excess carbohydrates. 9: Patient to follow-up with me in approximately 6 months, or sooner as needed. Patient states understanding and agrees with plan. Orders Placed This Encounter    TSH 3RD GENERATION    T4, FREE    CK    METABOLIC PANEL, COMPREHENSIVE    LIPID PANEL    PSA SCREENING (SCREENING)         ATTENTION:   This medical record was transcribed using an electronic medical records system. Although proofread, it may and can contain electronic and spelling errors. Other human spelling and other errors may be present. Corrections may be executed at a later time. Please feel free to contact us for any clarifications as needed. Signed,  Ne Cornelius.  Salvador Hanna, MSN APRN FNP-BC

## 2022-11-30 LAB
ALBUMIN SERPL-MCNC: 4.1 G/DL (ref 3.5–5)
ALBUMIN/GLOB SERPL: 1.5 {RATIO} (ref 1.1–2.2)
ALP SERPL-CCNC: 87 U/L (ref 45–117)
ALT SERPL-CCNC: 33 U/L (ref 12–78)
ANION GAP SERPL CALC-SCNC: 6 MMOL/L (ref 5–15)
AST SERPL-CCNC: 16 U/L (ref 15–37)
BILIRUB SERPL-MCNC: 0.3 MG/DL (ref 0.2–1)
BUN SERPL-MCNC: 18 MG/DL (ref 6–20)
BUN/CREAT SERPL: 19 (ref 12–20)
CALCIUM SERPL-MCNC: 10.5 MG/DL (ref 8.5–10.1)
CHLORIDE SERPL-SCNC: 102 MMOL/L (ref 97–108)
CHOLEST SERPL-MCNC: 162 MG/DL
CK SERPL-CCNC: 53 U/L (ref 39–308)
CO2 SERPL-SCNC: 28 MMOL/L (ref 21–32)
CREAT SERPL-MCNC: 0.95 MG/DL (ref 0.7–1.3)
GLOBULIN SER CALC-MCNC: 2.7 G/DL (ref 2–4)
GLUCOSE SERPL-MCNC: 224 MG/DL (ref 65–100)
HDLC SERPL-MCNC: 27 MG/DL
HDLC SERPL: 6 {RATIO} (ref 0–5)
LDLC SERPL CALC-MCNC: 66 MG/DL (ref 0–100)
POTASSIUM SERPL-SCNC: 4.5 MMOL/L (ref 3.5–5.1)
PROT SERPL-MCNC: 6.8 G/DL (ref 6.4–8.2)
PSA SERPL-MCNC: 1.8 NG/ML (ref 0.01–4)
SODIUM SERPL-SCNC: 136 MMOL/L (ref 136–145)
T4 FREE SERPL-MCNC: 1.2 NG/DL (ref 0.8–1.5)
TRIGL SERPL-MCNC: 345 MG/DL (ref ?–150)
TSH SERPL DL<=0.05 MIU/L-ACNC: 3.56 UIU/ML (ref 0.36–3.74)
VLDLC SERPL CALC-MCNC: 69 MG/DL

## 2022-11-30 NOTE — PROGRESS NOTES
Prostate level is in normal range. Cholesterol levels are acceptable. Triglycerides remain quite high. Continue fenofibrate and simvastatin as prescribed. Focus on low-fat diet. Metabolic panel is unremarkable. CK level is normal.  Thyroid levels are in normal range.

## 2022-12-01 ENCOUNTER — PATIENT MESSAGE (OUTPATIENT)
Dept: INTERNAL MEDICINE CLINIC | Age: 57
End: 2022-12-01

## 2022-12-01 DIAGNOSIS — F41.9 ANXIETY: Primary | ICD-10-CM

## 2022-12-02 RX ORDER — ALPRAZOLAM 0.25 MG/1
0.25 TABLET ORAL
Qty: 30 TABLET | Refills: 2 | Status: SHIPPED | OUTPATIENT
Start: 2022-12-02

## 2022-12-02 NOTE — TELEPHONE ENCOUNTER
From: Nishi Allan  To: Kimberly Atkinson NP  Sent: 12/1/2022 11:52 AM EST  Subject: Follow-up from [address] appointment    Rehan:  On Tuesday, we talked about adjusting the dosage on my sertraline if I needed it to get through the next month and feel better. I don't think I want to change that, but I might want to add something. Last week, the Commission announced an in-person [employer] and holiday lunch for 12/13. Yesterday, my manager essentially made it mandatory that we show up (we have to get approval if we can't/won't be there--not kidding). Given how my social anxiety has worsened because of the pandemic, of course, this prospect scares the pants off me. In previous years when I lived up near [place], I would sometimes get a prescription for a tranquilizer (and if I were at all smart, I'd tell you which one it was, although after an Interwebs search, I THINK it was buspirone), and I'm thinking if I do that again, it could help with getting through the next few weeks and hopefully keep me relatively calm for the lunch. What do you think? If you agree, could you send the prescription to [facility]? Thank you, Babak Martin, and happy holidays!   Odetta Cooks

## 2023-01-09 DIAGNOSIS — E03.9 ACQUIRED HYPOTHYROIDISM: ICD-10-CM

## 2023-01-09 RX ORDER — LEVOTHYROXINE SODIUM 100 UG/1
100 TABLET ORAL
Qty: 90 TABLET | Refills: 3 | Status: SHIPPED | OUTPATIENT
Start: 2023-01-09

## 2023-01-09 NOTE — TELEPHONE ENCOUNTER
Requested Prescriptions     Pending Prescriptions Disp Refills    levothyroxine (SYNTHROID) 100 mcg tablet 90 Tablet 3     Sig: Take 1 Tablet by mouth Daily (before breakfast).

## 2023-01-11 DIAGNOSIS — F51.01 PRIMARY INSOMNIA: ICD-10-CM

## 2023-01-11 DIAGNOSIS — N62 GYNECOMASTIA, MALE: ICD-10-CM

## 2023-01-11 DIAGNOSIS — E29.1 HYPOGONADISM IN MALE: ICD-10-CM

## 2023-01-11 RX ORDER — TESTOSTERONE CYPIONATE 200 MG/ML
INJECTION, SOLUTION INTRAMUSCULAR
Qty: 1 ML | Refills: 2 | Status: SHIPPED | OUTPATIENT
Start: 2023-01-11

## 2023-01-11 RX ORDER — ZOLPIDEM TARTRATE 5 MG/1
5 TABLET ORAL
Qty: 30 TABLET | Refills: 1 | Status: SHIPPED | OUTPATIENT
Start: 2023-01-11

## 2023-02-09 RX ORDER — LANCETS
EACH MISCELLANEOUS
Qty: 200 EACH | Refills: 3 | Status: SHIPPED | OUTPATIENT
Start: 2023-02-09

## 2023-02-09 RX ORDER — BLOOD SUGAR DIAGNOSTIC
STRIP MISCELLANEOUS
Qty: 200 STRIP | Refills: 3 | Status: SHIPPED | OUTPATIENT
Start: 2023-02-09

## 2023-02-09 NOTE — TELEPHONE ENCOUNTER
Requested Prescriptions     Pending Prescriptions Disp Refills    glucose blood VI test strips (Accu-Chek Guide test strips) strip 200 Strip 3     Sig: Monitor blood sugar twice daily. lancets (Accu-Chek Fastclix Lancet Drum) misc 200 Each 3     Sig: Monitor blood sugar twice daily.

## 2023-02-13 ENCOUNTER — OFFICE VISIT (OUTPATIENT)
Dept: ENDOCRINOLOGY | Age: 58
End: 2023-02-13
Payer: COMMERCIAL

## 2023-02-13 VITALS
WEIGHT: 297.2 LBS | HEART RATE: 87 BPM | DIASTOLIC BLOOD PRESSURE: 76 MMHG | HEIGHT: 73 IN | SYSTOLIC BLOOD PRESSURE: 138 MMHG | BODY MASS INDEX: 39.39 KG/M2

## 2023-02-13 DIAGNOSIS — E29.1 HYPOGONADISM IN MALE: ICD-10-CM

## 2023-02-13 DIAGNOSIS — E11.39 TYPE 2 DIABETES MELLITUS WITH OTHER OPHTHALMIC COMPLICATION, WITH LONG-TERM CURRENT USE OF INSULIN (HCC): Primary | ICD-10-CM

## 2023-02-13 DIAGNOSIS — Z79.4 TYPE 2 DIABETES MELLITUS WITH OTHER OPHTHALMIC COMPLICATION, WITH LONG-TERM CURRENT USE OF INSULIN (HCC): Primary | ICD-10-CM

## 2023-02-13 DIAGNOSIS — E11.65 TYPE 2 DIABETES MELLITUS WITH HYPERGLYCEMIA, WITH LONG-TERM CURRENT USE OF INSULIN (HCC): ICD-10-CM

## 2023-02-13 DIAGNOSIS — Z79.4 TYPE 2 DIABETES MELLITUS WITH HYPERGLYCEMIA, WITH LONG-TERM CURRENT USE OF INSULIN (HCC): ICD-10-CM

## 2023-02-13 DIAGNOSIS — E03.9 ACQUIRED HYPOTHYROIDISM: ICD-10-CM

## 2023-02-13 LAB — HBA1C MFR BLD HPLC: 8.4 %

## 2023-02-13 PROCEDURE — 83036 HEMOGLOBIN GLYCOSYLATED A1C: CPT | Performed by: INTERNAL MEDICINE

## 2023-02-13 PROCEDURE — 3052F HG A1C>EQUAL 8.0%<EQUAL 9.0%: CPT | Performed by: INTERNAL MEDICINE

## 2023-02-13 PROCEDURE — 99214 OFFICE O/P EST MOD 30 MIN: CPT | Performed by: INTERNAL MEDICINE

## 2023-02-13 PROCEDURE — 3075F SYST BP GE 130 - 139MM HG: CPT | Performed by: INTERNAL MEDICINE

## 2023-02-13 PROCEDURE — 3078F DIAST BP <80 MM HG: CPT | Performed by: INTERNAL MEDICINE

## 2023-02-13 NOTE — PROGRESS NOTES
This is a 82-year-old white male with a history of type 2 diabetes mellitus diagnosed in 1999 and primary hypothyroidism. Strong family history of diabetes. His father and his paternal grandfather have or had diabetes. He was found to have diabetes on a routine physical.  He was placed on metformin and glipizide and over the last 20 years the regimen has been changed to add insulin. He says his most recent A1c was 10.1%. The diabetes is complicated by retinopathy and is currently receiving injections into the right. At my first visit, I added Ozempic which was titrated up to 1.0 mg weekly which he continues to take. He had a recent A1c of 6.4%. His A1c today is 8.4%. Current Diabetes Medication  Metformin 1000 BID  Glipizide 10 mg BID  70/30 insulin 30 AM and 10 units PM  Ozempic 1.0 mg weeky     When I saw him last he was waking up in the morning with blood sugars of . He tells me now that his morning blood sugars range between 100-170. He readily admits that he has been eating poorly over the last 3 to 6 months. He says this was particularly on the holidays. There is depression as well as the holidays in general so he is not really surprised that his A1c is increased so much. He is getting very little physical activity because of his knee. He works as a  and lives with his mother. He is working from home. He also tells me that he recently moved into his mother's house because of the stress of the apartment. This has caused some consternation in terms of his lifestyle but he is trying to adjust.     Breakfast is tickly particularly an egg and muffin or cereal.  Lunch can be 2 hotdogs without Western Arabella fries. He drinks Pepsi 0. Dinner last night was salmon and broccoli with no rice. The night before that he had ribs and Western Arabella fries. He snacks on nuts before lunch and before dinner. He denies chest pain, shortness of breath, constipation or diarrhea.   He complains of nocturia x1. Examination  Blood pressure 138/76  Pulse 88 and regular  Weight 297  BMI 39.2  Extremities  Diabetic foot exam:     Left Foot:   Visual Exam: great toe   Pulse DP: 2+ (normal)   Filament test: reduced sensation    Vibratory sensation: diminished      Right Foot:   Visual Exam: right great toe with some cracking   Pulse DP: 2+ (normal)   Filament test: absent sensation    Vibratory sensation: diminished     Impression  1. Type 2 diabetes mellitus with a significant increase in A1c on combination therapy  2. Obesity  3. Diabetic peripheral neuropathy with bilateral calluses on the great toe, right greater than left  4. Depression    Plan:  1. For now I have continue the above regimen. The patient feels like he can get back on track in terms of his diet. We did agree that if he is not able to get fasting blood sugars down under 150, he would increase the evening dose of insulin so that the numbers would improve and his A1c as well. 2.  I counseled him regarding his calluses particularly the right great toe  3.   I will see him back in 4 months

## 2023-02-13 NOTE — PROGRESS NOTES
Chief Complaint   Patient presents with    Follow-up    Diabetes     Patient is here for a F/U for Diabetes. Vitals:    02/13/23 0827   BP: 138/76   Pulse: 87   Weight: 297 lb 3.2 oz (134.8 kg)   Height: 6' 1\" (1.854 m)   PainSc:   0 - No pain       Current Outpatient Medications on File Prior to Visit   Medication Sig Dispense Refill    glucose blood VI test strips (Accu-Chek Guide test strips) strip Monitor blood sugar twice daily. 200 Strip 3    lancets (Accu-Chek Fastclix Lancet Drum) misc Monitor blood sugar twice daily. 200 Each 3    glipiZIDE (GLUCOTROL) 10 mg tablet Take 1 tablet by mouth twice daily. 180 Tablet 1    lisinopriL (PRINIVIL, ZESTRIL) 20 mg tablet Take 1 tablet by mouth daily. 90 Tablet 1    sertraline (ZOLOFT) 100 mg tablet Take 1 tablet by mouth twice daily. 180 Tablet 1    traZODone (DESYREL) 50 mg tablet Take 1 tablet by mouth at bedtime. 90 Tablet 1    testosterone cypionate (DEPOTESTOTERONE CYPIONATE) 200 mg/mL injection INJECT 1ML INTO MUSCLE ONCE EVERY 30 DAYS - PREFILLED SYRINGES 1 mL 2    zolpidem (AMBIEN) 5 mg tablet TAKE 1 TABLET BY MOUTH NIGHTLY AS NEEDED FOR SLEEP. MAX DAILY AMOUNT: 5 MG. INDICATIONS: DIFFICULTY FALLING ASLEEP 30 Tablet 1    levothyroxine (SYNTHROID) 100 mcg tablet Take 1 Tablet by mouth Daily (before breakfast). 90 Tablet 3    fenofibrate (LOFIBRA) 160 mg tablet TAKE 1 TABLET EVERY DAY FOR HIGH CHOLESTEROL AND HIGH TRIGLYCERIDES 90 Tablet 1    insulin NPH/insulin regular (NovoLIN 70/30 U-100 Insulin) 100 unit/mL (70-30) injection INJECT 30 UNITS SUBCUTANEOUSLY EVERY MORNING AND 10 UNITS EVERY EVENING 30 mL 3    omeprazole (PRILOSEC) 20 mg capsule Take 1 capsule by mouth once daily. 90 Capsule 1    simvastatin (ZOCOR) 40 mg tablet Take 1 tablet by mouth daily. 90 Tablet 1    metFORMIN (GLUCOPHAGE) 1,000 mg tablet Take 1 tablet by mouth twice daily. 180 Tablet 1    Ozempic 1 mg/dose (4 mg/3 mL) pnij INJECT 1 MG BY SUBCUTANEOUS ROUTE EVERY SEVEN (7) DAYS.  3 Each 2    tamsulosin (FLOMAX) 0.4 mg capsule TAKE 1 CAPSULE BY MOUTH DAILY WITH DINNER 90 Capsule 1    Lancing Device with Lancets (Accu-Chek FastClix Lancing Dev) kit Monitor blood sugar twice daily. 1 Kit 0    Blood-Glucose Meter (Accu-Chek Guide Glucose Meter) misc Monitor blood sugar twice daily. 1 Each 0    dietary supplement cap Take  by mouth. CBD capsules      calcium citrate-vitamin D3 (CITRACAL WITH VITAMIN D MAXIMUM) tablet Take 1 Tab by mouth daily. loratadine (CLARITIN) 10 mg tablet Take 10 mg by mouth daily. magnesium oxide (MAG-OX) 400 mg tablet Take 400 mg by mouth daily. multivit-min/FA/lycopen/lutein (CENTRUM SILVER MEN PO) Take 1 Tab by mouth daily. ALPRAZolam (XANAX) 0.25 mg tablet Take 1 Tablet by mouth three (3) times daily as needed for Anxiety. Max Daily Amount: 0.75 mg. (Patient not taking: Reported on 2/13/2023) 30 Tablet 2     No current facility-administered medications on file prior to visit. No Known Allergies        1. Have you been to the ER, urgent care clinic since your last visit? Hospitalized since your last visit? No    2. Have you seen or consulted any other health care providers outside of the [address] [place] [address] since your last visit? Include any pap smears or colon screening.  No

## 2023-06-02 RX ORDER — FENOFIBRATE 160 MG/1
TABLET ORAL
Qty: 90 TABLET | Refills: 1 | Status: SHIPPED | OUTPATIENT
Start: 2023-06-02

## 2023-06-07 ENCOUNTER — NURSE ONLY (OUTPATIENT)
Facility: CLINIC | Age: 58
End: 2023-06-07
Payer: COMMERCIAL

## 2023-06-07 VITALS
WEIGHT: 297 LBS | OXYGEN SATURATION: 98 % | HEART RATE: 87 BPM | DIASTOLIC BLOOD PRESSURE: 76 MMHG | RESPIRATION RATE: 18 BRPM | BODY MASS INDEX: 39.36 KG/M2 | HEIGHT: 73 IN | SYSTOLIC BLOOD PRESSURE: 138 MMHG

## 2023-06-07 DIAGNOSIS — Z23 NEED FOR PROPHYLACTIC VACCINATION AND INOCULATION AGAINST VARICELLA: Primary | ICD-10-CM

## 2023-06-07 PROCEDURE — 90750 HZV VACC RECOMBINANT IM: CPT | Performed by: NURSE PRACTITIONER

## 2023-06-07 PROCEDURE — 90471 IMMUNIZATION ADMIN: CPT | Performed by: NURSE PRACTITIONER

## 2023-06-07 SDOH — ECONOMIC STABILITY: FOOD INSECURITY: WITHIN THE PAST 12 MONTHS, YOU WORRIED THAT YOUR FOOD WOULD RUN OUT BEFORE YOU GOT MONEY TO BUY MORE.: NEVER TRUE

## 2023-06-07 SDOH — ECONOMIC STABILITY: FOOD INSECURITY: WITHIN THE PAST 12 MONTHS, THE FOOD YOU BOUGHT JUST DIDN'T LAST AND YOU DIDN'T HAVE MONEY TO GET MORE.: NEVER TRUE

## 2023-06-07 SDOH — ECONOMIC STABILITY: INCOME INSECURITY: HOW HARD IS IT FOR YOU TO PAY FOR THE VERY BASICS LIKE FOOD, HOUSING, MEDICAL CARE, AND HEATING?: NOT HARD AT ALL

## 2023-06-07 SDOH — ECONOMIC STABILITY: HOUSING INSECURITY
IN THE LAST 12 MONTHS, WAS THERE A TIME WHEN YOU DID NOT HAVE A STEADY PLACE TO SLEEP OR SLEPT IN A SHELTER (INCLUDING NOW)?: NO

## 2023-06-07 ASSESSMENT — PATIENT HEALTH QUESTIONNAIRE - PHQ9
5. POOR APPETITE OR OVEREATING: 0
8. MOVING OR SPEAKING SO SLOWLY THAT OTHER PEOPLE COULD HAVE NOTICED. OR THE OPPOSITE, BEING SO FIGETY OR RESTLESS THAT YOU HAVE BEEN MOVING AROUND A LOT MORE THAN USUAL: 0
SUM OF ALL RESPONSES TO PHQ QUESTIONS 1-9: 0
2. FEELING DOWN, DEPRESSED OR HOPELESS: 0
SUM OF ALL RESPONSES TO PHQ QUESTIONS 1-9: 0
9. THOUGHTS THAT YOU WOULD BE BETTER OFF DEAD, OR OF HURTING YOURSELF: 0
6. FEELING BAD ABOUT YOURSELF - OR THAT YOU ARE A FAILURE OR HAVE LET YOURSELF OR YOUR FAMILY DOWN: 0
10. IF YOU CHECKED OFF ANY PROBLEMS, HOW DIFFICULT HAVE THESE PROBLEMS MADE IT FOR YOU TO DO YOUR WORK, TAKE CARE OF THINGS AT HOME, OR GET ALONG WITH OTHER PEOPLE: 0
3. TROUBLE FALLING OR STAYING ASLEEP: 0
SUM OF ALL RESPONSES TO PHQ9 QUESTIONS 1 & 2: 0
SUM OF ALL RESPONSES TO PHQ QUESTIONS 1-9: 0
SUM OF ALL RESPONSES TO PHQ QUESTIONS 1-9: 0
1. LITTLE INTEREST OR PLEASURE IN DOING THINGS: 0
7. TROUBLE CONCENTRATING ON THINGS, SUCH AS READING THE NEWSPAPER OR WATCHING TELEVISION: 0
4. FEELING TIRED OR HAVING LITTLE ENERGY: 0

## 2023-06-07 NOTE — PROGRESS NOTES
Randall Carver is 56 y.o. male    Chief Complaint   Patient presents with    Immunizations     1 st shingles vaccine       /76 (Site: Left Upper Arm, Position: Sitting, Cuff Size: Large Adult)   Pulse 87   Resp 18   Ht 6' 1\" (1.854 m)   Wt 297 lb (134.7 kg)   SpO2 98%   BMI 39.18 kg/m²       1. Have you been to the ER, urgent care clinic or hospitalized since your last visit? No     2. Have you seen or consulted any other health care providers outside of the [address] [place] [address] since your last visit? Include any pap smears or colon screening. No       Administered shingrix in left deltoid patient tolerated injection well.     Lot#:    Exp:5/27/24    UXR:[id number]

## 2023-06-27 ENCOUNTER — OFFICE VISIT (OUTPATIENT)
Facility: CLINIC | Age: 58
End: 2023-06-27
Payer: COMMERCIAL

## 2023-06-27 VITALS
SYSTOLIC BLOOD PRESSURE: 134 MMHG | BODY MASS INDEX: 39.76 KG/M2 | HEIGHT: 73 IN | WEIGHT: 300 LBS | TEMPERATURE: 98.6 F | HEART RATE: 76 BPM | DIASTOLIC BLOOD PRESSURE: 80 MMHG | OXYGEN SATURATION: 98 % | RESPIRATION RATE: 16 BRPM

## 2023-06-27 DIAGNOSIS — E78.2 MIXED HYPERLIPIDEMIA: ICD-10-CM

## 2023-06-27 DIAGNOSIS — E03.9 ACQUIRED HYPOTHYROIDISM: ICD-10-CM

## 2023-06-27 DIAGNOSIS — E66.01 CLASS 2 SEVERE OBESITY DUE TO EXCESS CALORIES WITH SERIOUS COMORBIDITY AND BODY MASS INDEX (BMI) OF 39.0 TO 39.9 IN ADULT (HCC): ICD-10-CM

## 2023-06-27 DIAGNOSIS — I10 ESSENTIAL HYPERTENSION: ICD-10-CM

## 2023-06-27 DIAGNOSIS — Z79.4 TYPE 2 DIABETES MELLITUS WITH OTHER SPECIFIED COMPLICATION, WITH LONG-TERM CURRENT USE OF INSULIN (HCC): ICD-10-CM

## 2023-06-27 DIAGNOSIS — Z00.00 ROUTINE PHYSICAL EXAMINATION: Primary | ICD-10-CM

## 2023-06-27 DIAGNOSIS — F33.0 MILD EPISODE OF RECURRENT MAJOR DEPRESSIVE DISORDER (HCC): ICD-10-CM

## 2023-06-27 DIAGNOSIS — Z12.11 SCREEN FOR COLON CANCER: ICD-10-CM

## 2023-06-27 DIAGNOSIS — E11.69 TYPE 2 DIABETES MELLITUS WITH OTHER SPECIFIED COMPLICATION, WITH LONG-TERM CURRENT USE OF INSULIN (HCC): ICD-10-CM

## 2023-06-27 DIAGNOSIS — E55.9 VITAMIN D DEFICIENCY: ICD-10-CM

## 2023-06-27 PROBLEM — E11.9 DIABETES MELLITUS (HCC): Status: ACTIVE | Noted: 2021-02-25

## 2023-06-27 PROBLEM — F33.41 RECURRENT MAJOR DEPRESSIVE DISORDER, IN PARTIAL REMISSION (HCC): Status: ACTIVE | Noted: 2023-06-27

## 2023-06-27 PROBLEM — E66.812 CLASS 2 SEVERE OBESITY DUE TO EXCESS CALORIES WITH SERIOUS COMORBIDITY AND BODY MASS INDEX (BMI) OF 39.0 TO 39.9 IN ADULT: Status: ACTIVE | Noted: 2021-02-25

## 2023-06-27 LAB
25(OH)D3 SERPL-MCNC: 28.3 NG/ML (ref 30–100)
ALBUMIN SERPL-MCNC: 4.1 G/DL (ref 3.5–5)
ALBUMIN/GLOB SERPL: 1.6 (ref 1.1–2.2)
ALP SERPL-CCNC: 54 U/L (ref 45–117)
ALT SERPL-CCNC: 32 U/L (ref 12–78)
ANION GAP SERPL CALC-SCNC: 6 MMOL/L (ref 5–15)
AST SERPL-CCNC: 14 U/L (ref 15–37)
BILIRUB SERPL-MCNC: 0.2 MG/DL (ref 0.2–1)
BUN SERPL-MCNC: 18 MG/DL (ref 6–20)
BUN/CREAT SERPL: 21 (ref 12–20)
CALCIUM SERPL-MCNC: 9.8 MG/DL (ref 8.5–10.1)
CHLORIDE SERPL-SCNC: 105 MMOL/L (ref 97–108)
CHOLEST SERPL-MCNC: 160 MG/DL
CO2 SERPL-SCNC: 26 MMOL/L (ref 21–32)
CREAT SERPL-MCNC: 0.86 MG/DL (ref 0.7–1.3)
CREAT UR-MCNC: 114 MG/DL
ERYTHROCYTE [DISTWIDTH] IN BLOOD BY AUTOMATED COUNT: 13.7 % (ref 11.5–14.5)
GLOBULIN SER CALC-MCNC: 2.5 G/DL (ref 2–4)
GLUCOSE SERPL-MCNC: 196 MG/DL (ref 65–100)
HCT VFR BLD AUTO: 39.6 % (ref 36.6–50.3)
HDLC SERPL-MCNC: 30 MG/DL
HDLC SERPL: 5.3 (ref 0–5)
HGB BLD-MCNC: 12.8 G/DL (ref 12.1–17)
LDLC SERPL CALC-MCNC: 71.6 MG/DL (ref 0–100)
MCH RBC QN AUTO: 29.3 PG (ref 26–34)
MCHC RBC AUTO-ENTMCNC: 32.3 G/DL (ref 30–36.5)
MCV RBC AUTO: 90.6 FL (ref 80–99)
MICROALBUMIN UR-MCNC: 1.07 MG/DL
MICROALBUMIN/CREAT UR-RTO: 9 MG/G (ref 0–30)
NRBC # BLD: 0 K/UL (ref 0–0.01)
NRBC BLD-RTO: 0 PER 100 WBC
PLATELET # BLD AUTO: 194 K/UL (ref 150–400)
PMV BLD AUTO: 10.3 FL (ref 8.9–12.9)
POTASSIUM SERPL-SCNC: 4.2 MMOL/L (ref 3.5–5.1)
PROT SERPL-MCNC: 6.6 G/DL (ref 6.4–8.2)
RBC # BLD AUTO: 4.37 M/UL (ref 4.1–5.7)
SODIUM SERPL-SCNC: 137 MMOL/L (ref 136–145)
TRIGL SERPL-MCNC: 292 MG/DL
VLDLC SERPL CALC-MCNC: 58.4 MG/DL
WBC # BLD AUTO: 7.3 K/UL (ref 4.1–11.1)

## 2023-06-27 PROCEDURE — 99396 PREV VISIT EST AGE 40-64: CPT | Performed by: NURSE PRACTITIONER

## 2023-06-27 PROCEDURE — 3075F SYST BP GE 130 - 139MM HG: CPT | Performed by: NURSE PRACTITIONER

## 2023-06-27 PROCEDURE — 3079F DIAST BP 80-89 MM HG: CPT | Performed by: NURSE PRACTITIONER

## 2023-06-27 RX ORDER — AMOXICILLIN 500 MG
CAPSULE ORAL
COMMUNITY

## 2023-06-27 RX ORDER — BLOOD SUGAR DIAGNOSTIC
STRIP MISCELLANEOUS
COMMUNITY
Start: 2023-06-16

## 2023-06-27 RX ORDER — LANCETS
EACH MISCELLANEOUS
COMMUNITY
Start: 2023-06-15

## 2023-06-27 SDOH — ECONOMIC STABILITY: INCOME INSECURITY: HOW HARD IS IT FOR YOU TO PAY FOR THE VERY BASICS LIKE FOOD, HOUSING, MEDICAL CARE, AND HEATING?: NOT HARD AT ALL

## 2023-06-27 SDOH — ECONOMIC STABILITY: FOOD INSECURITY: WITHIN THE PAST 12 MONTHS, THE FOOD YOU BOUGHT JUST DIDN'T LAST AND YOU DIDN'T HAVE MONEY TO GET MORE.: NEVER TRUE

## 2023-06-27 SDOH — ECONOMIC STABILITY: FOOD INSECURITY: WITHIN THE PAST 12 MONTHS, YOU WORRIED THAT YOUR FOOD WOULD RUN OUT BEFORE YOU GOT MONEY TO BUY MORE.: NEVER TRUE

## 2023-06-28 ENCOUNTER — TELEPHONE (OUTPATIENT)
Facility: CLINIC | Age: 58
End: 2023-06-28

## 2023-06-28 DIAGNOSIS — E03.9 ACQUIRED HYPOTHYROIDISM: Primary | ICD-10-CM

## 2023-06-28 LAB
T4 FREE SERPL-MCNC: 1.2 NG/DL (ref 0.8–1.5)
TSH SERPL DL<=0.05 MIU/L-ACNC: 3.86 UIU/ML (ref 0.36–3.74)

## 2023-06-28 RX ORDER — LEVOTHYROXINE SODIUM 0.12 MG/1
125 TABLET ORAL DAILY
Qty: 90 TABLET | Refills: 1 | Status: SHIPPED | OUTPATIENT
Start: 2023-06-28

## 2023-08-14 RX ORDER — LISINOPRIL 20 MG/1
TABLET ORAL DAILY
Qty: 90 TABLET | Refills: 1 | Status: SHIPPED | OUTPATIENT
Start: 2023-08-14

## 2023-08-14 RX ORDER — SERTRALINE HYDROCHLORIDE 100 MG/1
TABLET, FILM COATED ORAL
Qty: 180 TABLET | Refills: 1 | Status: SHIPPED | OUTPATIENT
Start: 2023-08-14

## 2023-08-14 RX ORDER — TRAZODONE HYDROCHLORIDE 50 MG/1
TABLET ORAL
Qty: 90 TABLET | Refills: 1 | Status: SHIPPED | OUTPATIENT
Start: 2023-08-14

## 2023-08-14 RX ORDER — GLIPIZIDE 10 MG/1
TABLET ORAL
Qty: 180 TABLET | Refills: 1 | Status: SHIPPED | OUTPATIENT
Start: 2023-08-14

## 2023-08-14 NOTE — TELEPHONE ENCOUNTER
PCP: CONNOR Carroll NP    Last appt: 6/27/2023  Future Appointments   Date Time Provider [address]   10/11/2023  1:00 PM NURSE VISIT [facility]   10/16/2023  8:30 AM Dung Irby MD [facility] [address] [facility] AM[facility]   1/3/2024  8:30 AM CONNOR Carroll NP [facility] AM[facility]       Requested Prescriptions     Pending Prescriptions Disp Refills    glipiZIDE (GLUCOTROL) 10 MG tablet [Pharmacy Med Name: Glipizide 10mg Tablet] 180 tablet 1     Sig: Take 1 tablet by mouth twice daily. traZODone (DESYREL) 50 MG tablet [Pharmacy Med Name: Trazodone Hydrochloride 50mg Tablet] 90 tablet 1     Sig: Take 1 tablet by mouth at bedtime. lisinopril (PRINIVIL;ZESTRIL) 20 MG tablet [Pharmacy Med Name: Lisinopril 20mg Tablet] 90 tablet 1     Sig: Take 1 tablet by mouth daily. sertraline (ZOLOFT) 100 MG tablet [Pharmacy Med Name: Sertraline Hydrochloride 100mg Tablet] 180 tablet 1     Sig: Take 1 tablet by mouth twice daily.        Prior labs and Blood pressures:  BP Readings from Last 3 Encounters:   06/27/23 134/80   06/12/23 (!) 152/78   06/07/23 138/76     Lab Results   Component Value Date/Time     06/27/2023 10:07 AM    K 4.2 06/27/2023 10:07 AM     06/27/2023 10:07 AM    CO2 26 06/27/2023 10:07 AM    BUN 18 06/27/2023 10:07 AM    GFRAA >60 01/24/2022 10:09 AM     Lab Results   Component Value Date/Time    DBC2KVSQ 7.9 06/12/2023 08:40 AM     Lab Results   Component Value Date/Time    CHOL 160 06/27/2023 10:07 AM    HDL 30 06/27/2023 10:07 AM    VLDL 48 05/24/2022 08:33 AM    VLDL 58 02/25/2021 02:02 PM     No results found for: VITD3, VD3RIA        Lab Results   Component Value Date/Time    TSH 3.56 11/29/2022 09:34 AM

## 2023-09-18 RX ORDER — TAMSULOSIN HYDROCHLORIDE 0.4 MG/1
CAPSULE ORAL
Qty: 90 CAPSULE | Refills: 0 | Status: SHIPPED | OUTPATIENT
Start: 2023-09-18

## 2023-09-18 NOTE — TELEPHONE ENCOUNTER
PCP: CONNOR Douglas NP    Last appt: 6/27/2023    Future Appointments   Date Time Provider [address]   10/11/2023  1:00 PM NURSE VISIT [facility]   10/16/2023  8:30 AM Ubaldo Ramos MD [facility] [address] [facility] AM[facility]   1/3/2024  8:30 AM CONNOR Douglas NP [facility] AM[facility]       Requested Prescriptions     Pending Prescriptions Disp Refills    tamsulosin (FLOMAX) 0.4 MG capsule [Pharmacy Med Name: TAMSULOSIN 0.4MG] 90 capsule 0     Sig: TAKE 1 CAPSULE BY MOUTH DAILY WITH DINNER TO IMPROVE URINE FLOW

## 2023-10-11 ENCOUNTER — NURSE ONLY (OUTPATIENT)
Facility: CLINIC | Age: 58
End: 2023-10-11
Payer: COMMERCIAL

## 2023-10-11 VITALS
OXYGEN SATURATION: 98 % | TEMPERATURE: 98.2 F | DIASTOLIC BLOOD PRESSURE: 74 MMHG | RESPIRATION RATE: 18 BRPM | WEIGHT: 295.2 LBS | HEIGHT: 73 IN | BODY MASS INDEX: 39.12 KG/M2 | HEART RATE: 85 BPM | SYSTOLIC BLOOD PRESSURE: 132 MMHG

## 2023-10-11 DIAGNOSIS — Z23 NEED FOR PROPHYLACTIC VACCINATION AND INOCULATION AGAINST VARICELLA: ICD-10-CM

## 2023-10-11 DIAGNOSIS — Z23 NEEDS FLU SHOT: Primary | ICD-10-CM

## 2023-10-11 PROCEDURE — 90750 HZV VACC RECOMBINANT IM: CPT | Performed by: NURSE PRACTITIONER

## 2023-10-11 PROCEDURE — 90472 IMMUNIZATION ADMIN EACH ADD: CPT | Performed by: NURSE PRACTITIONER

## 2023-10-11 PROCEDURE — 90674 CCIIV4 VAC NO PRSV 0.5 ML IM: CPT | Performed by: NURSE PRACTITIONER

## 2023-10-11 PROCEDURE — 90471 IMMUNIZATION ADMIN: CPT | Performed by: NURSE PRACTITIONER

## 2023-10-11 RX ORDER — SIMVASTATIN 40 MG
TABLET ORAL DAILY
Qty: 90 TABLET | Refills: 1 | Status: SHIPPED | OUTPATIENT
Start: 2023-10-11

## 2023-10-11 RX ORDER — OMEPRAZOLE 20 MG/1
20 CAPSULE, DELAYED RELEASE ORAL DAILY
Qty: 90 CAPSULE | Refills: 1 | Status: SHIPPED | OUTPATIENT
Start: 2023-10-11

## 2023-10-11 NOTE — TELEPHONE ENCOUNTER
PCP: CONNOR Davidson NP    Last appt: 6/27/2023    Future Appointments   Date Time Provider [address]   10/11/2023  1:00 PM NURSE VISIT JEANNA SM[facility]   10/16/2023  8:30 AM Vicky Millan MD [facility] [address] [address] [facility] AM[facility]   1/3/2024  8:30 AM CONNOR Davidson NP [facility]       Requested Prescriptions     Pending Prescriptions Disp Refills    metFORMIN (GLUCOPHAGE) 1000 MG tablet [Pharmacy Med Name: Metformin Hydrochloride 1000mg Tablet] 180 tablet 1     Sig: Take 1 tablet by mouth twice daily. omeprazole (PRILOSEC) 20 MG delayed release capsule [Pharmacy Med Name: Omeprazole 20mg Delayed-Release Capsule] 90 capsule 1     Sig: Take 1 capsule by mouth once daily. simvastatin (ZOCOR) 40 MG tablet [Pharmacy Med Name: Simvastatin 40mg Tablet] 90 tablet 1     Sig: Take 1 tablet by mouth daily.

## 2023-10-16 ENCOUNTER — OFFICE VISIT (OUTPATIENT)
Age: 58
End: 2023-10-16
Payer: COMMERCIAL

## 2023-10-16 VITALS
SYSTOLIC BLOOD PRESSURE: 140 MMHG | HEART RATE: 83 BPM | WEIGHT: 293.2 LBS | BODY MASS INDEX: 38.86 KG/M2 | DIASTOLIC BLOOD PRESSURE: 83 MMHG | HEIGHT: 73 IN

## 2023-10-16 DIAGNOSIS — E03.8 HYPOTHYROIDISM DUE TO HASHIMOTO'S THYROIDITIS: ICD-10-CM

## 2023-10-16 DIAGNOSIS — E06.3 HYPOTHYROIDISM DUE TO HASHIMOTO'S THYROIDITIS: ICD-10-CM

## 2023-10-16 DIAGNOSIS — Z79.4 LONG TERM (CURRENT) USE OF INSULIN (HCC): ICD-10-CM

## 2023-10-16 DIAGNOSIS — E11.39 TYPE 2 DIABETES MELLITUS WITH OTHER DIABETIC OPHTHALMIC COMPLICATION (HCC): Primary | ICD-10-CM

## 2023-10-16 LAB — HBA1C MFR BLD: 7.9 %

## 2023-10-16 PROCEDURE — 3079F DIAST BP 80-89 MM HG: CPT | Performed by: INTERNAL MEDICINE

## 2023-10-16 PROCEDURE — 3077F SYST BP >= 140 MM HG: CPT | Performed by: INTERNAL MEDICINE

## 2023-10-16 PROCEDURE — 99214 OFFICE O/P EST MOD 30 MIN: CPT | Performed by: INTERNAL MEDICINE

## 2023-10-16 PROCEDURE — 83036 HEMOGLOBIN GLYCOSYLATED A1C: CPT | Performed by: INTERNAL MEDICINE

## 2023-10-16 NOTE — PROGRESS NOTES
This is a 56-year-old white male with a history of type 2 diabetes mellitus diagnosed in 1999 and primary hypothyroidism. Strong family history of diabetes. His father and his paternal grandfather have or had diabetes. He was found to have diabetes on a routine physical.  He was placed on metformin and glipizide and over the last 20 years the regimen has been changed to add insulin. He says his most recent A1c was 10.1%. The diabetes is complicated by retinopathy and is currently receiving injections into the right. At my first visit, I added Ozempic which was titrated up to 1.0 mg weekly which he continues to take. When I saw him last his A1c was 7.9%. His A1c today is 7.9%. Current Diabetes Medication  Metformin 1000 BID  Glipizide 10 mg BID  70/30 insulin 30 AM and 10 units PM  Ozempic 1.0 mg weeky     Since I saw him last, he has made some changes. Particularly a Slim fast for breakfast and/or lunch. Ria Sender is about the same. He is walking several times a week for about 30 minutes. He is monitoring his blood sugars. States that blood sugars range between 100-150. So all in all the dietary changes and exercise changes that is made is improving his blood sugar control overall. He denies chest pain, shortness of breath, constipation or diarrhea. He complains of nocturia x1. Examination  Blood pressure 140/83  Pulse 80  Weight 293 (lost 5 pounds)  BMI 38.7  HEENT unremarkable  Lungs clear  Heart is regular rate and rhythm  Abdomen obese  Extremities unremarkable  Diabetic foot exam:   Left Foot:   Visual Exam: normal   Pulse DP: 2+ (normal)   Filament test: normal sensation   Vibratory Sensation: normal  Right Foot:   Visual Exam: callous- significant callus medial aspect of the great toe   Pulse DP: 2+ (normal)   Filament test: normal sensation   Vibratory Sensation: normal       Impression  1. Type 2 diabetes mellitus with an A1c of 7.9% on combination therapy  2.   Primary

## 2023-12-01 NOTE — TELEPHONE ENCOUNTER
Requested Prescriptions     Pending Prescriptions Disp Refills    fenofibrate (TRIGLIDE) 160 MG tablet [Pharmacy Med Name: FENOFIBRATE 160MG] 90 tablet 1     Sig: TAKE 1 TABLET EVERY DAY FOR HIGH CHOLESTEROL AND HIGH TRIGLYCERIDES       RX refill request from the patient/pharmacy. Patient last seen 6/27/23 with labs, and next appt. scheduled for 1/3/24.

## 2023-12-04 RX ORDER — FENOFIBRATE 160 MG/1
TABLET ORAL
Qty: 90 TABLET | Refills: 1 | Status: SHIPPED | OUTPATIENT
Start: 2023-12-04

## 2023-12-10 DIAGNOSIS — E03.9 ACQUIRED HYPOTHYROIDISM: ICD-10-CM

## 2023-12-11 RX ORDER — SEMAGLUTIDE 1.34 MG/ML
INJECTION, SOLUTION SUBCUTANEOUS
Qty: 9 ML | Refills: 2 | Status: SHIPPED | OUTPATIENT
Start: 2023-12-11

## 2023-12-11 RX ORDER — LEVOTHYROXINE SODIUM 0.12 MG/1
125 TABLET ORAL DAILY
Qty: 90 TABLET | Refills: 1 | Status: SHIPPED | OUTPATIENT
Start: 2023-12-11

## 2023-12-11 NOTE — TELEPHONE ENCOUNTER
PCP: CONNOR Fuchs NP    Last appt: 6/27/2023    Future Appointments   Date Time Provider [address]   1/3/2024  8:30 AM CONNOR Fuchs NP [facility] BS AMB   2/16/2024  8:30 AM Shirley Lepe MD [facility] [address] [facility] AMB       Requested Prescriptions     Pending Prescriptions Disp Refills    levothyroxine (SYNTHROID) 125 MCG tablet [Pharmacy Med Name: Levothyroxine Sodium 125mcg Tablet] 90 tablet 1     Sig: Take 1 tablet by mouth daily.

## 2023-12-14 DIAGNOSIS — E29.1 TESTICULAR HYPOFUNCTION: ICD-10-CM

## 2023-12-14 RX ORDER — TESTOSTERONE CYPIONATE 200 MG/ML
INJECTION, SOLUTION INTRAMUSCULAR
Qty: 1 ML | Refills: 4 | Status: SHIPPED | OUTPATIENT
Start: 2023-12-14 | End: 2024-12-13

## 2023-12-14 NOTE — TELEPHONE ENCOUNTER
PCP: CONNOR Toscano NP    Last appt: 6/27/2023    Future Appointments   Date Time Provider [address]   1/3/2024  8:30 AM CONNOR Toscano NP [facility] BS AMB   2/16/2024  8:30 AM Taryn Carr MD [facility] [address] BS AMB       Requested Prescriptions     Pending Prescriptions Disp Refills    testosterone cypionate (DEPOTESTOTERONE CYPIONATE) 200 MG/ML injection [Pharmacy Med Name: TESTOSTERONE CYPIONATE INJ 200MG/ML] 1 mL 4     Sig: PREFILLED INJECT 1ML INTO MUSCLE ONCE EVERY 30 DAYS - PREFILLED SYRINGE

## 2023-12-15 RX ORDER — TAMSULOSIN HYDROCHLORIDE 0.4 MG/1
CAPSULE ORAL
Qty: 90 CAPSULE | Refills: 1 | Status: SHIPPED | OUTPATIENT
Start: 2023-12-15

## 2023-12-15 NOTE — TELEPHONE ENCOUNTER
PCP: CONNOR Harris NP    Last appt: 6/27/2023    Future Appointments   Date Time Provider [address]   1/3/2024  8:30 AM CONNOR Harris NP [facility] BS AMB   2/16/2024  8:30 AM Raffi Maurer MD [facility] [address] BS AMB       Requested Prescriptions     Pending Prescriptions Disp Refills    tamsulosin (FLOMAX) 0.4 MG capsule [Pharmacy Med Name: *TAMSULOSIN 0.4MG] 90 capsule 1     Sig: TAKE 1 CAPSULE BY MOUTH DAILY WITH DINNER TO IMPROVE URINE FLOW

## 2024-01-03 ENCOUNTER — OFFICE VISIT (OUTPATIENT)
Facility: CLINIC | Age: 59
End: 2024-01-03
Payer: COMMERCIAL

## 2024-01-03 VITALS
SYSTOLIC BLOOD PRESSURE: 130 MMHG | HEIGHT: 73 IN | BODY MASS INDEX: 39.36 KG/M2 | HEART RATE: 77 BPM | DIASTOLIC BLOOD PRESSURE: 76 MMHG | WEIGHT: 297 LBS | TEMPERATURE: 97.5 F | OXYGEN SATURATION: 99 % | RESPIRATION RATE: 16 BRPM

## 2024-01-03 DIAGNOSIS — E03.9 ACQUIRED HYPOTHYROIDISM: ICD-10-CM

## 2024-01-03 DIAGNOSIS — Z12.11 SCREENING FOR COLON CANCER: ICD-10-CM

## 2024-01-03 DIAGNOSIS — I10 ESSENTIAL HYPERTENSION: ICD-10-CM

## 2024-01-03 DIAGNOSIS — E78.2 MIXED HYPERLIPIDEMIA: ICD-10-CM

## 2024-01-03 DIAGNOSIS — F33.1 MAJOR DEPRESSIVE DISORDER, RECURRENT, MODERATE (HCC): Primary | ICD-10-CM

## 2024-01-03 DIAGNOSIS — E55.9 VITAMIN D DEFICIENCY: ICD-10-CM

## 2024-01-03 PROCEDURE — 3078F DIAST BP <80 MM HG: CPT | Performed by: NURSE PRACTITIONER

## 2024-01-03 PROCEDURE — 99214 OFFICE O/P EST MOD 30 MIN: CPT | Performed by: NURSE PRACTITIONER

## 2024-01-03 PROCEDURE — 3075F SYST BP GE 130 - 139MM HG: CPT | Performed by: NURSE PRACTITIONER

## 2024-01-03 RX ORDER — BUPROPION HYDROCHLORIDE 150 MG/1
150 TABLET ORAL EVERY MORNING
Qty: 90 TABLET | Refills: 0 | Status: SHIPPED | OUTPATIENT
Start: 2024-01-03

## 2024-01-03 ASSESSMENT — COLUMBIA-SUICIDE SEVERITY RATING SCALE - C-SSRS
1. WITHIN THE PAST MONTH, HAVE YOU WISHED YOU WERE DEAD OR WISHED YOU COULD GO TO SLEEP AND NOT WAKE UP?: NO
6. HAVE YOU EVER DONE ANYTHING, STARTED TO DO ANYTHING, OR PREPARED TO DO ANYTHING TO END YOUR LIFE?: NO
2. HAVE YOU ACTUALLY HAD ANY THOUGHTS OF KILLING YOURSELF?: NO

## 2024-01-03 ASSESSMENT — PATIENT HEALTH QUESTIONNAIRE - PHQ9
2. FEELING DOWN, DEPRESSED OR HOPELESS: 3
5. POOR APPETITE OR OVEREATING: 0
9. THOUGHTS THAT YOU WOULD BE BETTER OFF DEAD, OR OF HURTING YOURSELF: 2
1. LITTLE INTEREST OR PLEASURE IN DOING THINGS: 3
SUM OF ALL RESPONSES TO PHQ9 QUESTIONS 1 & 2: 6
3. TROUBLE FALLING OR STAYING ASLEEP: 2
SUM OF ALL RESPONSES TO PHQ QUESTIONS 1-9: 14
8. MOVING OR SPEAKING SO SLOWLY THAT OTHER PEOPLE COULD HAVE NOTICED. OR THE OPPOSITE, BEING SO FIGETY OR RESTLESS THAT YOU HAVE BEEN MOVING AROUND A LOT MORE THAN USUAL: 0
10. IF YOU CHECKED OFF ANY PROBLEMS, HOW DIFFICULT HAVE THESE PROBLEMS MADE IT FOR YOU TO DO YOUR WORK, TAKE CARE OF THINGS AT HOME, OR GET ALONG WITH OTHER PEOPLE: 1
4. FEELING TIRED OR HAVING LITTLE ENERGY: 1
6. FEELING BAD ABOUT YOURSELF - OR THAT YOU ARE A FAILURE OR HAVE LET YOURSELF OR YOUR FAMILY DOWN: 3
SUM OF ALL RESPONSES TO PHQ QUESTIONS 1-9: 14
7. TROUBLE CONCENTRATING ON THINGS, SUCH AS READING THE NEWSPAPER OR WATCHING TELEVISION: 0
SUM OF ALL RESPONSES TO PHQ QUESTIONS 1-9: 14
SUM OF ALL RESPONSES TO PHQ QUESTIONS 1-9: 12

## 2024-01-03 NOTE — PROGRESS NOTES
Carlos Mcmahon is a 58 y.o. male     Chief Complaint   Patient presents with    Diabetes     8M       /76 (Site: Left Upper Arm, Position: Sitting, Cuff Size: Large Adult)   Pulse 77   Temp 97.5 °F (36.4 °C) (Oral)   Resp 16   Ht 1.854 m (6' 1\")   Wt 134.7 kg (297 lb)   SpO2 99%   BMI 39.18 kg/m²     Health Maintenance Due   Topic Date Due    Hepatitis B vaccine (1 of 3 - 3-dose series) Never done    Diabetic retinal exam  Never done    DTaP/Tdap/Td vaccine (1 - Tdap) Never done    Colorectal Cancer Screen  Never done    Pneumococcal 0-64 years Vaccine (2 - PCV) 06/01/2014    COVID-19 Vaccine (6 - 2023-24 season) 09/01/2023         \"Have you been to the ER, urgent care clinic since your last visit?  Hospitalized since your last visit?\"    NO    “Have you seen or consulted any other health care providers outside of [facility] since your last visit?”    NO    “Have you had a colorectal cancer screening such as a colonoscopy/FIT/Cologuard?    NO

## 2024-01-03 NOTE — PROGRESS NOTES
Chief Complaint   Patient presents with    Diabetes     8M       SUBJECTIVE:    Carlos Mcmahon is a 58 y.o. male who is here today for a follow up appointment regarding current medical conditions including: MDD, HTN, mixed hyperlipidemia, hypothyroidism, vitamin D deficiency, and requesting referral for screening colonoscopy.    The patient remains on Zoloft 100 mg x 2 tablets daily for management of his depression.  He feels that his benefit has been minimal on the medication, and continues to have episodes of depression throughout the week.  He states this is also compounded by stress at work in general.  He admits to being quite introverted, and states the holidays are also stressful due to his agnostic beliefs.  He has thought about counseling/therapy, but has had difficulty finding someone who can see him for telehealth visits either in the evening or on the weekends.  He continues to use alprazolam as needed for breakthrough anxiety which has been helpful.    He remains on lisinopril for management of his hypertension.  His blood pressure has remained stable and in good control overall.  He is on a combination of fenofibrate and simvastatin for management of his mixed hyperlipidemia.  He denies any adverse side effects of the medication.  He denies any recent episodes of chest pain, chest pressure, shortness of breath, headaches, dizziness, blurred vision, palpitations, or syncope episodes.    He remains on levothyroxine daily for management of his hypothyroidism.  His TSH was slightly elevated above normal at last check.  He continues to be followed by endocrinology for his diabetes management.    He remains on vitamin D supplementation due to history of deficiency.    All current medications were reviewed and updated in chart today.  Previous labs were reviewed during patient encounter today.    Current Outpatient Medications   Medication Sig Dispense Refill    buPROPion (WELLBUTRIN XL) 150 MG extended

## 2024-01-04 LAB
25(OH)D3 SERPL-MCNC: 31.9 NG/ML (ref 30–100)
ALBUMIN SERPL-MCNC: 4.2 G/DL (ref 3.5–5)
ALBUMIN/GLOB SERPL: 1.4 (ref 1.1–2.2)
ALP SERPL-CCNC: 67 U/L (ref 45–117)
ALT SERPL-CCNC: 34 U/L (ref 12–78)
ANION GAP SERPL CALC-SCNC: 7 MMOL/L (ref 5–15)
AST SERPL-CCNC: 15 U/L (ref 15–37)
BILIRUB SERPL-MCNC: 0.3 MG/DL (ref 0.2–1)
BUN SERPL-MCNC: 16 MG/DL (ref 6–20)
BUN/CREAT SERPL: 16 (ref 12–20)
CALCIUM SERPL-MCNC: 9.5 MG/DL (ref 8.5–10.1)
CHLORIDE SERPL-SCNC: 103 MMOL/L (ref 97–108)
CHOLEST SERPL-MCNC: 179 MG/DL
CO2 SERPL-SCNC: 26 MMOL/L (ref 21–32)
CREAT SERPL-MCNC: 0.99 MG/DL (ref 0.7–1.3)
ERYTHROCYTE [DISTWIDTH] IN BLOOD BY AUTOMATED COUNT: 13.5 % (ref 11.5–14.5)
GLOBULIN SER CALC-MCNC: 3.1 G/DL (ref 2–4)
GLUCOSE SERPL-MCNC: 213 MG/DL (ref 65–100)
HCT VFR BLD AUTO: 44 % (ref 36.6–50.3)
HDLC SERPL-MCNC: 29 MG/DL
HDLC SERPL: 6.2 (ref 0–5)
HGB BLD-MCNC: 14.3 G/DL (ref 12.1–17)
LDLC SERPL CALC-MCNC: 81.2 MG/DL (ref 0–100)
MCH RBC QN AUTO: 29.3 PG (ref 26–34)
MCHC RBC AUTO-ENTMCNC: 32.5 G/DL (ref 30–36.5)
MCV RBC AUTO: 90.2 FL (ref 80–99)
NRBC # BLD: 0 K/UL (ref 0–0.01)
NRBC BLD-RTO: 0 PER 100 WBC
PLATELET # BLD AUTO: 212 K/UL (ref 150–400)
PMV BLD AUTO: 10.2 FL (ref 8.9–12.9)
POTASSIUM SERPL-SCNC: 4 MMOL/L (ref 3.5–5.1)
PROT SERPL-MCNC: 7.3 G/DL (ref 6.4–8.2)
RBC # BLD AUTO: 4.88 M/UL (ref 4.1–5.7)
SODIUM SERPL-SCNC: 136 MMOL/L (ref 136–145)
T4 FREE SERPL-MCNC: 1.3 NG/DL (ref 0.8–1.5)
TRIGL SERPL-MCNC: 344 MG/DL
TSH SERPL DL<=0.05 MIU/L-ACNC: 1.52 UIU/ML (ref 0.36–3.74)
VLDLC SERPL CALC-MCNC: 68.8 MG/DL
WBC # BLD AUTO: 8.8 K/UL (ref 4.1–11.1)

## 2024-01-09 RX ORDER — BLOOD SUGAR DIAGNOSTIC
STRIP MISCELLANEOUS
Qty: 200 STRIP | Refills: 2 | OUTPATIENT
Start: 2024-01-09

## 2024-01-09 RX ORDER — BLOOD SUGAR DIAGNOSTIC
STRIP MISCELLANEOUS
Qty: 200 EACH | Refills: 3 | Status: SHIPPED | OUTPATIENT
Start: 2024-01-09

## 2024-01-09 RX ORDER — LANCETS
EACH MISCELLANEOUS
Qty: 200 EACH | Refills: 3 | Status: SHIPPED | OUTPATIENT
Start: 2024-01-09

## 2024-01-09 RX ORDER — LANCETS
EACH MISCELLANEOUS
Qty: 200 EACH | Refills: 1 | OUTPATIENT
Start: 2024-01-09

## 2024-01-09 NOTE — TELEPHONE ENCOUNTER
Requested Prescriptions     Pending Prescriptions Disp Refills    ACCU-CHEK GUIDE strip 200 each 3     Sig: Monitor blood sugar 2 times daily.    Accu-Chek FastClix Lancets MISC 200 each 3     Sig: Monitor blood sugar 2 times daily.

## 2024-01-09 NOTE — TELEPHONE ENCOUNTER
PCP: Addi Corral APRN - NP    Last appt: 10/16/2023    Future Appointments   Date Time Provider Department Center   2/14/2024  8:30 AM Addi Corral APRN - NP [facility] AMB   2/16/2024  8:30 AM Ramu Hammond MD [address] [address] BS AMB       Requested Prescriptions     Pending Prescriptions Disp Refills    Accu-Chek FastClix Lancets MISC [Pharmacy Med Name: Accu-Chek Fastclix Lancet] 200 each 1     Sig: Use to monitor blood sugar twice daily.    blood glucose test strips (ACCU-CHEK GUIDE) strip [Pharmacy Med Name: Accu-Chek Guide Test Strip] 200 strip 2     Sig: Use to monitor blood sugar twice daily.

## 2024-02-08 RX ORDER — LISINOPRIL 20 MG/1
TABLET ORAL DAILY
Qty: 90 TABLET | Refills: 1 | Status: SHIPPED | OUTPATIENT
Start: 2024-02-08

## 2024-02-08 RX ORDER — SERTRALINE HYDROCHLORIDE 100 MG/1
TABLET, FILM COATED ORAL
Qty: 180 TABLET | Refills: 1 | Status: SHIPPED | OUTPATIENT
Start: 2024-02-08

## 2024-02-08 RX ORDER — GLIPIZIDE 10 MG/1
TABLET ORAL
Qty: 180 TABLET | Refills: 1 | Status: SHIPPED | OUTPATIENT
Start: 2024-02-08

## 2024-02-08 RX ORDER — TRAZODONE HYDROCHLORIDE 50 MG/1
TABLET ORAL
Qty: 90 TABLET | Refills: 1 | Status: SHIPPED | OUTPATIENT
Start: 2024-02-08

## 2024-02-08 NOTE — TELEPHONE ENCOUNTER
Last Refill: 8-14-23  Last Visit: 1/3/2024   Next Visit: 2/14/2024     Requested Prescriptions     Pending Prescriptions Disp Refills    glipiZIDE (GLUCOTROL) 10 MG tablet [Pharmacy Med Name: Glipizide 10mg Tablet] 180 tablet 0     Sig: Take 1 tablet by mouth twice daily.    sertraline (ZOLOFT) 100 MG tablet [Pharmacy Med Name: Sertraline Hydrochloride 100mg Tablet] 180 tablet 0     Sig: Take 1 tablet by mouth twice daily.    lisinopril (PRINIVIL;ZESTRIL) 20 MG tablet [Pharmacy Med Name: Lisinopril 20mg Tablet] 90 tablet 0     Sig: Take 1 tablet by mouth daily.    traZODone (DESYREL) 50 MG tablet [Pharmacy Med Name: Trazodone Hydrochloride 50mg Tablet] 90 tablet 0     Sig: Take 1 tablet by mouth at bedtime.

## 2024-02-16 ENCOUNTER — OFFICE VISIT (OUTPATIENT)
Age: 59
End: 2024-02-16
Payer: COMMERCIAL

## 2024-02-16 VITALS
HEIGHT: 73 IN | BODY MASS INDEX: 38.43 KG/M2 | HEART RATE: 87 BPM | SYSTOLIC BLOOD PRESSURE: 131 MMHG | WEIGHT: 290 LBS | DIASTOLIC BLOOD PRESSURE: 78 MMHG

## 2024-02-16 DIAGNOSIS — E11.39 TYPE 2 DIABETES MELLITUS WITH OTHER DIABETIC OPHTHALMIC COMPLICATION (HCC): Primary | ICD-10-CM

## 2024-02-16 LAB — HBA1C MFR BLD: 7.9 %

## 2024-02-16 PROCEDURE — 3075F SYST BP GE 130 - 139MM HG: CPT | Performed by: INTERNAL MEDICINE

## 2024-02-16 PROCEDURE — 3078F DIAST BP <80 MM HG: CPT | Performed by: INTERNAL MEDICINE

## 2024-02-16 PROCEDURE — 83036 HEMOGLOBIN GLYCOSYLATED A1C: CPT | Performed by: INTERNAL MEDICINE

## 2024-02-16 PROCEDURE — 99214 OFFICE O/P EST MOD 30 MIN: CPT | Performed by: INTERNAL MEDICINE

## 2024-02-16 RX ORDER — SEMAGLUTIDE 2.68 MG/ML
2 INJECTION, SOLUTION SUBCUTANEOUS
Qty: 3 ML | Refills: 4 | Status: SHIPPED | OUTPATIENT
Start: 2024-02-16

## 2024-02-16 NOTE — PROGRESS NOTES
This is a 57-year-old white male with a history of type 2 diabetes mellitus diagnosed in 1999 and primary hypothyroidism. Strong family history of diabetes.  His father and his paternal grandfather have or had diabetes.  He was found to have diabetes on a routine physical.  He was placed on metformin and glipizide and over the last 20 years the regimen has been changed to add insulin.  He says his most recent A1c was 10.1%.  The diabetes is complicated by retinopathy and is currently receiving injections into the right.     At my first visit, I added Ozempic which was titrated up to 1.0 mg weekly which he continues to take.  When I saw him last his A1c was 7.9%.  His A1c today is 7.9%.     Current Diabetes Medication  Metformin 1000 BID  Glipizide 10 mg BID  70/30 insulin 30 AM and 10 units PM  Ozempic 1.0 mg weeky     Since I saw him last, he has made some changes.  Particularly a Slim fast for breakfast and/or lunch.  Dinner is about the same.  He is walking several times a week for about 30 minutes.  He is monitoring his blood sugars.  States that blood sugars range between 100-150.  So all in all the dietary changes and exercise changes that is made is improving his blood sugar control overall.     He denies chest pain, shortness of breath, constipation or diarrhea.  He complains of nocturia x1.    Examination  Blood pressure 131/78  Pulse 87  Weight 131 kg  BMI 38.3  HEENT unremarkable  Lungs clear  Heart reveals a regular rate and rhythm  Abdomen benign although obese  Extremities unremarkable  Diabetic foot exam:   Left Foot:   Visual Exam: normal   Pulse DP: 2+ (normal)   Filament test: normal sensation   Vibratory Sensation: diminished  Right Foot:   Visual Exam: callous- right great toe callus with some blood   Pulse DP: 1+ (weak)   Filament test: normal sensation   Vibratory Sensation: diminished     Impression  1.  Type 2 diabetes mellitus with an A1c of 7.9% on combination therapy  2.  Obesity  3.

## 2024-02-22 ENCOUNTER — OFFICE VISIT (OUTPATIENT)
Facility: CLINIC | Age: 59
End: 2024-02-22
Payer: COMMERCIAL

## 2024-02-22 VITALS
BODY MASS INDEX: 38.97 KG/M2 | RESPIRATION RATE: 19 BRPM | TEMPERATURE: 98.6 F | WEIGHT: 294 LBS | SYSTOLIC BLOOD PRESSURE: 128 MMHG | HEIGHT: 73 IN | OXYGEN SATURATION: 97 % | DIASTOLIC BLOOD PRESSURE: 64 MMHG | HEART RATE: 91 BPM

## 2024-02-22 DIAGNOSIS — F40.10 SOCIAL ANXIETY DISORDER: ICD-10-CM

## 2024-02-22 DIAGNOSIS — F33.1 MAJOR DEPRESSIVE DISORDER, RECURRENT, MODERATE (HCC): Primary | ICD-10-CM

## 2024-02-22 PROCEDURE — 3074F SYST BP LT 130 MM HG: CPT | Performed by: NURSE PRACTITIONER

## 2024-02-22 PROCEDURE — 3078F DIAST BP <80 MM HG: CPT | Performed by: NURSE PRACTITIONER

## 2024-02-22 PROCEDURE — 99213 OFFICE O/P EST LOW 20 MIN: CPT | Performed by: NURSE PRACTITIONER

## 2024-02-22 RX ORDER — BUPROPION HYDROCHLORIDE 300 MG/1
300 TABLET ORAL EVERY MORNING
Qty: 90 TABLET | Refills: 1 | Status: SHIPPED | OUTPATIENT
Start: 2024-02-22

## 2024-02-22 NOTE — PROGRESS NOTES
Carlos Mcmahon is a 58 y.o. male     Chief Complaint   Patient presents with    Other     6 wk f/u on starting well butrin.       /64 (Site: Right Upper Arm, Position: Sitting, Cuff Size: Large Adult)   Pulse 91   Temp 98.6 °F (37 °C) (Oral)   Resp 19   Ht 1.854 m (6' 1\")   Wt 133.4 kg (294 lb)   SpO2 97%   BMI 38.79 kg/m²     Health Maintenance Due   Topic Date Due    Hepatitis B vaccine (1 of 3 - 3-dose series) Never done    Diabetic retinal exam  Never done    DTaP/Tdap/Td vaccine (1 - Tdap) Never done    Colorectal Cancer Screen  Never done    Pneumococcal 0-64 years Vaccine (2 - PCV) 06/01/2014    COVID-19 Vaccine (6 - 2023-24 season) 09/01/2023         \"Have you been to the ER, urgent care clinic since your last visit?  Hospitalized since your last visit?\"    NO    “Have you seen or consulted any other health care providers outside of [facility] since your last visit?”    NO    “Have you had a colorectal cancer screening such as a colonoscopy/FIT/Cologuard?    NO               
Clear to ascultation X5, normal inspiratory effort, no adventitious breath sounds.  Cardiovascular: Regular rate and rhythm, no rubs or gallops, PMI not displaced, No thrills  Neuro: Non-focal exam, A & O X 3.   Psychiatric: Appropriate affect and demeanor, pleasant and cooperative. Patient's thought content and thought processing appear to be within normal limits.     ASSESSMENT/PLAN:    Diagnosis Orders   1. Major depressive disorder, recurrent, moderate (HCC)  buPROPion (WELLBUTRIN XL) 300 MG extended release tablet      2. Social anxiety disorder  buPROPion (WELLBUTRIN XL) 300 MG extended release tablet        1: After discussion, patient agrees with increase of Wellbutrin XL from 150 mg daily to 300 mg daily.  2: Continue sertraline as prescribed.  3: Continue all other medications as prescribed.  4: Patient will follow-up with me in 3 months, or sooner as needed.  Patient states understanding and agrees with plan.    ATTENTION:   This medical record was transcribed using an electronic medical records system.  Although proofread, it may and can contain electronic and spelling errors.  Other human spelling and other errors may be present.  Corrections may be executed at a later time.  Please feel free to contact us for any clarifications as needed.    Signed,  Addi Corral, MSN APRN FNP-BC

## 2024-04-08 RX ORDER — OMEPRAZOLE 20 MG/1
20 CAPSULE, DELAYED RELEASE ORAL DAILY
Qty: 90 CAPSULE | Refills: 1 | Status: SHIPPED | OUTPATIENT
Start: 2024-04-08

## 2024-04-08 RX ORDER — SIMVASTATIN 40 MG
TABLET ORAL DAILY
Qty: 90 TABLET | Refills: 1 | Status: SHIPPED | OUTPATIENT
Start: 2024-04-08

## 2024-05-23 ENCOUNTER — OFFICE VISIT (OUTPATIENT)
Facility: CLINIC | Age: 59
End: 2024-05-23
Payer: COMMERCIAL

## 2024-05-23 VITALS
SYSTOLIC BLOOD PRESSURE: 126 MMHG | HEART RATE: 95 BPM | DIASTOLIC BLOOD PRESSURE: 74 MMHG | TEMPERATURE: 98.4 F | WEIGHT: 283.8 LBS | OXYGEN SATURATION: 98 % | RESPIRATION RATE: 16 BRPM | HEIGHT: 73 IN | BODY MASS INDEX: 37.61 KG/M2

## 2024-05-23 DIAGNOSIS — E66.01 SEVERE OBESITY (BMI 35.0-39.9) WITH COMORBIDITY (HCC): ICD-10-CM

## 2024-05-23 DIAGNOSIS — F33.1 MAJOR DEPRESSIVE DISORDER, RECURRENT, MODERATE (HCC): Primary | ICD-10-CM

## 2024-05-23 DIAGNOSIS — F40.10 SOCIAL ANXIETY DISORDER: ICD-10-CM

## 2024-05-23 DIAGNOSIS — Z12.11 SCREENING FOR COLON CANCER: ICD-10-CM

## 2024-05-23 DIAGNOSIS — I10 ESSENTIAL HYPERTENSION: ICD-10-CM

## 2024-05-23 PROCEDURE — 99214 OFFICE O/P EST MOD 30 MIN: CPT | Performed by: NURSE PRACTITIONER

## 2024-05-23 PROCEDURE — 3078F DIAST BP <80 MM HG: CPT | Performed by: NURSE PRACTITIONER

## 2024-05-23 PROCEDURE — 3074F SYST BP LT 130 MM HG: CPT | Performed by: NURSE PRACTITIONER

## 2024-05-23 NOTE — PROGRESS NOTES
Carlos Mcmahon is a 58 y.o. male     Chief Complaint   Patient presents with    Hypertension     3M       /74 (Site: Left Upper Arm, Position: Sitting, Cuff Size: Medium Adult)   Pulse 95   Temp 98.4 °F (36.9 °C) (Oral)   Resp 16   Ht 1.854 m (6' 1\")   Wt 128.7 kg (283 lb 12.8 oz)   SpO2 98%   BMI 37.44 kg/m²     Health Maintenance Due   Topic Date Due    Hepatitis B vaccine (1 of 3 - 3-dose series) Never done    Diabetic retinal exam  Never done    DTaP/Tdap/Td vaccine (1 - Tdap) Never done    Colorectal Cancer Screen  Never done    Pneumococcal 0-64 years Vaccine (2 of 2 - PCV) 06/01/2014    COVID-19 Vaccine (6 - 2023-24 season) 09/01/2023         \"Have you been to the ER, urgent care clinic since your last visit?  Hospitalized since your last visit?\"    NO    “Have you seen or consulted any other health care providers outside of [facility] since your last visit?”    NO    “Have you had a colorectal cancer screening such as a colonoscopy/FIT/Cologuard?    NO    No colonoscopy on file  No cologuard on file  No FIT/FOBT on file   No flexible sigmoidoscopy on file                    
PRAPARE - Transportation     Lack of Transportation (Non-Medical): No   Housing Stability: Unknown (6/27/2023)    Housing Stability Vital Sign     Unstable Housing in the Last Year: No     Family History   Problem Relation Age of Onset    Thyroid Disease Mother     Lung Cancer Father     Heart Disease Father     Diabetes Father     Mult Sclerosis Maternal Grandmother     Diabetes Maternal Grandfather     Cancer Paternal Grandfather        OBJECTIVE:     /74 (Site: Left Upper Arm, Position: Sitting, Cuff Size: Medium Adult)   Pulse 95   Temp 98.4 °F (36.9 °C) (Oral)   Resp 16   Ht 1.854 m (6' 1\")   Wt 128.7 kg (283 lb 12.8 oz)   SpO2 98%   BMI 37.44 kg/m²     Constitutional: He appears well nourished, of stated age, and dressed appropriately.  Eyes: Sclera anicteric, PERRLA, EOMI  Neck: Supple without lymphadenopathy.   Respiratory: Clear to ascultation X5, normal inspiratory effort, no adventitious breath sounds.  Cardiovascular: Regular rate and rhythm, no rubs or gallops, PMI not displaced, No thrills  Neuro: Non-focal exam, A & O X 3.   Psychiatric: Appropriate affect and demeanor, pleasant and cooperative. Patient's thought content and thought processing appear to be within normal limits.     ASSESSMENT/PLAN:      Diagnosis Orders   1. Major depressive disorder, recurrent, moderate (HCC)        2. Severe obesity (BMI 35.0-39.9) with comorbidity (HCC)        3. Essential hypertension        4. Social anxiety disorder        5. Screening for colon cancer  NIKITA - Jani Laboy MD, Colorectal Surgery, [place]        1: Patient to continue current use of bupropion and sertraline for management of depression and social anxiety.  Symptoms appear to be fairly stable overall.  No significant changes.  2: Continue to focus on healthy lifestyle management with appropriate dietary intake.  Continue caloric reduction for weight loss.  Avoid concentrated sweets and excess carbohydrates.  Maintain use of

## 2024-06-07 DIAGNOSIS — E03.9 ACQUIRED HYPOTHYROIDISM: ICD-10-CM

## 2024-06-07 RX ORDER — LEVOTHYROXINE SODIUM 0.12 MG/1
125 TABLET ORAL DAILY
Qty: 90 TABLET | Refills: 1 | Status: SHIPPED | OUTPATIENT
Start: 2024-06-07

## 2024-06-07 NOTE — TELEPHONE ENCOUNTER
PCP: Addi Corral APRN - NP    Last appt: 5/23/2024    Future Appointments   Date Time Provider Department Center   6/19/2024  9:10 AM Ramu Hammond MD [facility] [address] BS AMB   9/24/2024  2:00 PM Addi Corral APRN - NP [facility] AMB   9/25/2024  8:40 AM LAB ONLY [facility] AMB       Requested Prescriptions     Pending Prescriptions Disp Refills    levothyroxine (SYNTHROID) 125 MCG tablet [Pharmacy Med Name: Levothyroxine Sodium 125mcg Tablet] 90 tablet 1     Sig: Take 1 tablet by mouth daily.

## 2024-06-13 DIAGNOSIS — E29.1 TESTICULAR HYPOFUNCTION: ICD-10-CM

## 2024-06-13 RX ORDER — FENOFIBRATE 160 MG/1
TABLET ORAL
Qty: 90 TABLET | Refills: 1 | Status: SHIPPED | OUTPATIENT
Start: 2024-06-13

## 2024-06-13 RX ORDER — TAMSULOSIN HYDROCHLORIDE 0.4 MG/1
CAPSULE ORAL
Qty: 90 CAPSULE | Refills: 1 | Status: SHIPPED | OUTPATIENT
Start: 2024-06-13

## 2024-06-13 RX ORDER — TESTOSTERONE CYPIONATE 200 MG/ML
INJECTION, SOLUTION INTRAMUSCULAR
Qty: 1 ML | Refills: 3 | Status: SHIPPED | OUTPATIENT
Start: 2024-06-13 | End: 2024-07-13

## 2024-06-13 NOTE — TELEPHONE ENCOUNTER
PCP: Addi Corral APRN - NP    Last appt: 5/23/2024    Future Appointments   Date Time Provider Department Center   6/19/2024  9:10 AM Ramu Hammond MD [facility] [address] BS AMB   9/24/2024  2:00 PM Addi Corral APRN - NP [facility]   9/25/2024  8:40 AM LAB ONLY [facility]       Requested Prescriptions     Pending Prescriptions Disp Refills    fenofibrate (TRIGLIDE) 160 MG tablet [Pharmacy Med Name: *FENOFIBRATE 160MG] 90 tablet 1     Sig: TAKE 1 TABLET EVERY DAY FOR HIGH CHOLESTEROL AND HIGH TRIGLYCERIDES

## 2024-06-13 NOTE — TELEPHONE ENCOUNTER
RX refill request from the patient/pharmacy. Patient last seen 05- with labs, and next appt. scheduled for 09-  Requested Prescriptions     Pending Prescriptions Disp Refills    tamsulosin (FLOMAX) 0.4 MG capsule [Pharmacy Med Name: TAMSULOSIN 0.4MG] 90 capsule 1     Sig: TAKE 1 CAPSULE BY MOUTH DAILY WITH DINNER TO IMPROVE URINE FLOW    .

## 2024-06-13 NOTE — TELEPHONE ENCOUNTER
PCP: Addi Corral, APRN - NP    Last appt: Visit date not found  Future Appointments   Date Time Provider Department Center   6/19/2024  9:10 AM Ramu Hammond MD [facility] [address] BS AMB   9/24/2024  2:00 PM Addi Corral, APRN - NP [facility] AMB   9/25/2024  8:40 AM LAB ONLY [facility] AMB       Requested Prescriptions     Pending Prescriptions Disp Refills    testosterone cypionate (DEPOTESTOTERONE CYPIONATE) 200 MG/ML injection [Pharmacy Med Name: TESTOST CYP INJ 200MG/ML] 1 mL 3     Sig: PREFILLED INJECT 1ML INTO MUSCLE ONCE EVERY 30 DAYS - PREFILLED SYRINGE 22G ONE AND 1/2 INCH       Prior labs and Blood pressures:  BP Readings from Last 3 Encounters:   05/23/24 126/74   02/22/24 128/64   02/16/24 131/78     Lab Results   Component Value Date/Time     01/03/2024 09:27 AM    K 4.0 01/03/2024 09:27 AM     01/03/2024 09:27 AM    CO2 26 01/03/2024 09:27 AM    BUN 16 01/03/2024 09:27 AM    GFRAA >60 01/24/2022 10:09 AM     Lab Results   Component Value Date/Time    [id number] 7.9 02/16/2024 09:05 AM     Lab Results   Component Value Date/Time    CHOL 179 01/03/2024 09:27 AM    HDL 29 01/03/2024 09:27 AM    LDL 81.2 01/03/2024 09:27 AM    VLDL 68.8 01/03/2024 09:27 AM    VLDL 48 05/24/2022 08:33 AM    VLDL 58 02/25/2021 02:02 PM     No results found for: \"VITD3\"        Lab Results   Component Value Date/Time    TSH 3.56 11/29/2022 09:34 AM

## 2024-06-19 ENCOUNTER — OFFICE VISIT (OUTPATIENT)
Age: 59
End: 2024-06-19
Payer: COMMERCIAL

## 2024-06-19 VITALS
DIASTOLIC BLOOD PRESSURE: 75 MMHG | OXYGEN SATURATION: 97 % | WEIGHT: 280.2 LBS | BODY MASS INDEX: 37.14 KG/M2 | HEART RATE: 87 BPM | SYSTOLIC BLOOD PRESSURE: 131 MMHG | RESPIRATION RATE: 18 BRPM | TEMPERATURE: 97 F | HEIGHT: 73 IN

## 2024-06-19 DIAGNOSIS — E11.39 TYPE 2 DIABETES MELLITUS WITH OTHER DIABETIC OPHTHALMIC COMPLICATION (HCC): Primary | ICD-10-CM

## 2024-06-19 LAB — HBA1C MFR BLD: 7.1 %

## 2024-06-19 PROCEDURE — 83036 HEMOGLOBIN GLYCOSYLATED A1C: CPT | Performed by: INTERNAL MEDICINE

## 2024-06-19 PROCEDURE — 3075F SYST BP GE 130 - 139MM HG: CPT | Performed by: INTERNAL MEDICINE

## 2024-06-19 PROCEDURE — 3078F DIAST BP <80 MM HG: CPT | Performed by: INTERNAL MEDICINE

## 2024-06-19 PROCEDURE — 99214 OFFICE O/P EST MOD 30 MIN: CPT | Performed by: INTERNAL MEDICINE

## 2024-06-19 ASSESSMENT — PATIENT HEALTH QUESTIONNAIRE - PHQ9
SUM OF ALL RESPONSES TO PHQ QUESTIONS 1-9: 5
7. TROUBLE CONCENTRATING ON THINGS, SUCH AS READING THE NEWSPAPER OR WATCHING TELEVISION: NOT AT ALL
8. MOVING OR SPEAKING SO SLOWLY THAT OTHER PEOPLE COULD HAVE NOTICED. OR THE OPPOSITE, BEING SO FIGETY OR RESTLESS THAT YOU HAVE BEEN MOVING AROUND A LOT MORE THAN USUAL: NOT AT ALL
5. POOR APPETITE OR OVEREATING: SEVERAL DAYS
10. IF YOU CHECKED OFF ANY PROBLEMS, HOW DIFFICULT HAVE THESE PROBLEMS MADE IT FOR YOU TO DO YOUR WORK, TAKE CARE OF THINGS AT HOME, OR GET ALONG WITH OTHER PEOPLE: NOT DIFFICULT AT ALL
SUM OF ALL RESPONSES TO PHQ9 QUESTIONS 1 & 2: 2
1. LITTLE INTEREST OR PLEASURE IN DOING THINGS: SEVERAL DAYS
4. FEELING TIRED OR HAVING LITTLE ENERGY: SEVERAL DAYS
SUM OF ALL RESPONSES TO PHQ QUESTIONS 1-9: 5
2. FEELING DOWN, DEPRESSED OR HOPELESS: SEVERAL DAYS
9. THOUGHTS THAT YOU WOULD BE BETTER OFF DEAD, OR OF HURTING YOURSELF: NOT AT ALL
SUM OF ALL RESPONSES TO PHQ QUESTIONS 1-9: 5
6. FEELING BAD ABOUT YOURSELF - OR THAT YOU ARE A FAILURE OR HAVE LET YOURSELF OR YOUR FAMILY DOWN: NOT AT ALL
3. TROUBLE FALLING OR STAYING ASLEEP: SEVERAL DAYS
SUM OF ALL RESPONSES TO PHQ QUESTIONS 1-9: 5

## 2024-06-19 NOTE — PROGRESS NOTES
Patient identified with two identification factors, Name and Date of Birth.    Chief Complaint   Patient presents with    Follow-up    Diabetes     Type 2 diabetes mellitus with other diabetic ophthalmic complication        /75 (Site: Left Upper Arm, Position: Sitting, Cuff Size: Large Adult)   Pulse 87   Temp 97 °F (36.1 °C) (Temporal)   Resp 18   Ht 1.854 m (6' 1\")   Wt 127.1 kg (280 lb 3.2 oz)   SpO2 97%   BMI 36.97 kg/m²       1. \"Have you been to the ER, urgent care clinic since your last visit?  Hospitalized since your last visit?\" No     2. \"Have you seen or consulted any other health care providers outside of the [facility] since your last visit?\" No     
test: normal sensation   Vibratory Sensation: diminished    Impression  1.  Type 2 diabetes mellitus with improving glucose control on combination therapy including Ozempic 2 mg weekly  2.  Obesity  3.  A chronic right great toe callus with some neuropathy    Plan:  1.  Continue the above regimen regarding his glucose  2.  I gave him the phone number of the podiatrist to whom I had referred him at our last visit.  Apparently that appointment never got made.  I did strongly encourage him to see the podiatrist and get the callus addressed.  3.  I will see him back in 4 to 6 months    Please note that this dictation was completed with [employer], the [employer] voice recognition software.  Quite often unanticipated grammatical, syntax, homophones, and other interpretive errors are inadvertently transcribed by the computer software.  Please disregard these errors.  Please excuse any errors that have escaped final proofreading.

## 2024-07-12 DIAGNOSIS — E11.39 TYPE 2 DIABETES MELLITUS WITH OTHER DIABETIC OPHTHALMIC COMPLICATION (HCC): ICD-10-CM

## 2024-07-13 RX ORDER — SEMAGLUTIDE 2.68 MG/ML
INJECTION, SOLUTION SUBCUTANEOUS
Qty: 3 ML | Refills: 3 | Status: SHIPPED | OUTPATIENT
Start: 2024-07-13

## 2024-08-02 DIAGNOSIS — E11.39 TYPE 2 DIABETES MELLITUS WITH OTHER DIABETIC OPHTHALMIC COMPLICATION (HCC): ICD-10-CM

## 2024-08-03 DIAGNOSIS — E11.69 TYPE 2 DIABETES MELLITUS WITH OTHER SPECIFIED COMPLICATION, WITH LONG-TERM CURRENT USE OF INSULIN (HCC): Primary | ICD-10-CM

## 2024-08-03 DIAGNOSIS — I10 ESSENTIAL HYPERTENSION: ICD-10-CM

## 2024-08-03 DIAGNOSIS — F33.1 MAJOR DEPRESSIVE DISORDER, RECURRENT, MODERATE (HCC): ICD-10-CM

## 2024-08-03 DIAGNOSIS — F51.04 PSYCHOPHYSIOLOGICAL INSOMNIA: ICD-10-CM

## 2024-08-03 DIAGNOSIS — Z79.4 TYPE 2 DIABETES MELLITUS WITH OTHER SPECIFIED COMPLICATION, WITH LONG-TERM CURRENT USE OF INSULIN (HCC): Primary | ICD-10-CM

## 2024-08-05 RX ORDER — LISINOPRIL 20 MG/1
TABLET ORAL DAILY
Qty: 90 TABLET | Refills: 1 | Status: SHIPPED | OUTPATIENT
Start: 2024-08-05

## 2024-08-05 RX ORDER — SERTRALINE HYDROCHLORIDE 100 MG/1
TABLET, FILM COATED ORAL
Qty: 180 TABLET | Refills: 1 | Status: SHIPPED | OUTPATIENT
Start: 2024-08-05

## 2024-08-05 RX ORDER — GLIPIZIDE 10 MG/1
TABLET ORAL
Qty: 180 TABLET | Refills: 1 | Status: SHIPPED | OUTPATIENT
Start: 2024-08-05

## 2024-08-05 RX ORDER — TRAZODONE HYDROCHLORIDE 50 MG/1
TABLET ORAL
Qty: 90 TABLET | Refills: 1 | Status: SHIPPED | OUTPATIENT
Start: 2024-08-05

## 2024-08-05 NOTE — TELEPHONE ENCOUNTER
PCP: Addi Corral APRN - NP    Last appt: 5/23/2024    Future Appointments   Date Time Provider Department Center   9/24/2024  2:00 [facility]   9/25/2024  8:40 AM LAB ONLY [facility]   12/4/2024  9:10 AM Ramu Hammond MD [facility] [address] BS [facility]       Requested Prescriptions     Pending Prescriptions Disp Refills    lisinopril (PRINIVIL;ZESTRIL) 20 MG tablet [Pharmacy Med Name: Lisinopril 20mg Tablet] 90 tablet 1     Sig: Take 1 tablet by mouth daily.    traZODone (DESYREL) 50 MG tablet [Pharmacy Med Name: Trazodone Hydrochloride 50mg Tablet] 90 tablet 1     Sig: Take 1 tablet by mouth at bedtime.    glipiZIDE (GLUCOTROL) 10 MG tablet [Pharmacy Med Name: Glipizide 10mg Tablet] 180 tablet 1     Sig: Take 1 tablet by mouth twice daily.    sertraline (ZOLOFT) 100 MG tablet [Pharmacy Med Name: Sertraline Hydrochloride 100mg Tablet] 180 tablet 1     Sig: Take 1 tablet by mouth twice daily.

## 2024-08-06 DIAGNOSIS — E11.39 TYPE 2 DIABETES MELLITUS WITH OTHER DIABETIC OPHTHALMIC COMPLICATION (HCC): ICD-10-CM

## 2024-08-06 NOTE — TELEPHONE ENCOUNTER
PCP: Addi Corral APRN - NP    Last appt: 6/19/2024  Future Appointments   Date Time Provider Department Center   9/24/2024  2:00 PM [facility]   9/25/2024  8:40 AM LAB ONLY [facility]   12/4/2024  9:10 AM Ramu Hammond MD [facility] [address] BS AMB       Requested Prescriptions     Pending Prescriptions Disp Refills    insulin 70-30 (HUMULIN;NOVOLIN) (70-30) 100 UNIT per ML injection vial 40 mL 3     Sig: INJECT 30 UNITS SUBCUTANEOUSLY EVERY MORNING AND 10 UNITS EVERY EVENING. Humulin please per insurance

## 2024-09-21 SDOH — ECONOMIC STABILITY: INCOME INSECURITY: HOW HARD IS IT FOR YOU TO PAY FOR THE VERY BASICS LIKE FOOD, HOUSING, MEDICAL CARE, AND HEATING?: NOT VERY HARD

## 2024-09-21 SDOH — ECONOMIC STABILITY: TRANSPORTATION INSECURITY
IN THE PAST 12 MONTHS, HAS LACK OF TRANSPORTATION KEPT YOU FROM MEETINGS, WORK, OR FROM GETTING THINGS NEEDED FOR DAILY LIVING?: NO

## 2024-09-21 SDOH — ECONOMIC STABILITY: FOOD INSECURITY: WITHIN THE PAST 12 MONTHS, THE FOOD YOU BOUGHT JUST DIDN'T LAST AND YOU DIDN'T HAVE MONEY TO GET MORE.: NEVER TRUE

## 2024-09-21 SDOH — ECONOMIC STABILITY: FOOD INSECURITY: WITHIN THE PAST 12 MONTHS, YOU WORRIED THAT YOUR FOOD WOULD RUN OUT BEFORE YOU GOT MONEY TO BUY MORE.: NEVER TRUE

## 2024-09-23 DIAGNOSIS — F33.1 MAJOR DEPRESSIVE DISORDER, RECURRENT, MODERATE (HCC): ICD-10-CM

## 2024-09-23 DIAGNOSIS — F40.10 SOCIAL ANXIETY DISORDER: ICD-10-CM

## 2024-09-23 RX ORDER — BUPROPION HYDROCHLORIDE 300 MG/1
300 TABLET ORAL EVERY MORNING
Qty: 90 TABLET | Refills: 1 | Status: SHIPPED | OUTPATIENT
Start: 2024-09-23

## 2024-09-24 ENCOUNTER — OFFICE VISIT (OUTPATIENT)
Facility: CLINIC | Age: 59
End: 2024-09-24
Payer: COMMERCIAL

## 2024-09-24 VITALS
OXYGEN SATURATION: 99 % | WEIGHT: 277.2 LBS | HEART RATE: 81 BPM | TEMPERATURE: 98.4 F | SYSTOLIC BLOOD PRESSURE: 130 MMHG | BODY MASS INDEX: 36.74 KG/M2 | HEIGHT: 73 IN | RESPIRATION RATE: 16 BRPM | DIASTOLIC BLOOD PRESSURE: 74 MMHG

## 2024-09-24 DIAGNOSIS — Z00.00 ROUTINE PHYSICAL EXAMINATION: Primary | ICD-10-CM

## 2024-09-24 DIAGNOSIS — I10 ESSENTIAL HYPERTENSION: ICD-10-CM

## 2024-09-24 DIAGNOSIS — E78.2 MIXED HYPERLIPIDEMIA: ICD-10-CM

## 2024-09-24 DIAGNOSIS — F33.0 MILD EPISODE OF RECURRENT MAJOR DEPRESSIVE DISORDER (HCC): ICD-10-CM

## 2024-09-24 DIAGNOSIS — F51.04 PSYCHOPHYSIOLOGICAL INSOMNIA: ICD-10-CM

## 2024-09-24 DIAGNOSIS — E29.1 TESTICULAR HYPOFUNCTION: ICD-10-CM

## 2024-09-24 DIAGNOSIS — Z79.4 TYPE 2 DIABETES MELLITUS WITH OTHER SPECIFIED COMPLICATION, WITH LONG-TERM CURRENT USE OF INSULIN (HCC): ICD-10-CM

## 2024-09-24 DIAGNOSIS — E11.69 TYPE 2 DIABETES MELLITUS WITH OTHER SPECIFIED COMPLICATION, WITH LONG-TERM CURRENT USE OF INSULIN (HCC): ICD-10-CM

## 2024-09-24 DIAGNOSIS — E66.01 CLASS 2 SEVERE OBESITY WITH SERIOUS COMORBIDITY AND BODY MASS INDEX (BMI) OF 36.0 TO 36.9 IN ADULT, UNSPECIFIED OBESITY TYPE: ICD-10-CM

## 2024-09-24 DIAGNOSIS — E03.9 ACQUIRED HYPOTHYROIDISM: ICD-10-CM

## 2024-09-24 DIAGNOSIS — Z12.5 SCREENING FOR MALIGNANT NEOPLASM OF PROSTATE: ICD-10-CM

## 2024-09-24 DIAGNOSIS — F40.10 SOCIAL ANXIETY DISORDER: ICD-10-CM

## 2024-09-24 PROBLEM — F33.1 MAJOR DEPRESSIVE DISORDER, RECURRENT, MODERATE (HCC): Status: ACTIVE | Noted: 2021-02-25

## 2024-09-24 PROCEDURE — 3075F SYST BP GE 130 - 139MM HG: CPT | Performed by: NURSE PRACTITIONER

## 2024-09-24 PROCEDURE — 99396 PREV VISIT EST AGE 40-64: CPT | Performed by: NURSE PRACTITIONER

## 2024-09-24 PROCEDURE — 3078F DIAST BP <80 MM HG: CPT | Performed by: NURSE PRACTITIONER

## 2024-09-24 RX ORDER — ZOLPIDEM TARTRATE 5 MG/1
5 TABLET ORAL NIGHTLY PRN
Qty: 30 TABLET | Refills: 5 | Status: SHIPPED | OUTPATIENT
Start: 2024-09-24 | End: 2025-03-23

## 2024-09-25 ENCOUNTER — LAB (OUTPATIENT)
Facility: CLINIC | Age: 59
End: 2024-09-25

## 2024-09-25 DIAGNOSIS — I10 ESSENTIAL HYPERTENSION: ICD-10-CM

## 2024-09-25 DIAGNOSIS — E03.9 ACQUIRED HYPOTHYROIDISM: ICD-10-CM

## 2024-09-25 DIAGNOSIS — E78.2 MIXED HYPERLIPIDEMIA: ICD-10-CM

## 2024-09-25 DIAGNOSIS — E11.69 TYPE 2 DIABETES MELLITUS WITH OTHER SPECIFIED COMPLICATION, WITH LONG-TERM CURRENT USE OF INSULIN (HCC): ICD-10-CM

## 2024-09-25 DIAGNOSIS — Z79.4 TYPE 2 DIABETES MELLITUS WITH OTHER SPECIFIED COMPLICATION, WITH LONG-TERM CURRENT USE OF INSULIN (HCC): ICD-10-CM

## 2024-09-25 DIAGNOSIS — Z12.5 SCREENING FOR MALIGNANT NEOPLASM OF PROSTATE: ICD-10-CM

## 2024-09-25 LAB
ALBUMIN SERPL-MCNC: 4 G/DL (ref 3.5–5)
ALBUMIN/GLOB SERPL: 1.5 (ref 1.1–2.2)
ALP SERPL-CCNC: 47 U/L (ref 45–117)
ALT SERPL-CCNC: 21 U/L (ref 12–78)
ANION GAP SERPL CALC-SCNC: 2 MMOL/L (ref 2–12)
AST SERPL-CCNC: 16 U/L (ref 15–37)
BASOPHILS # BLD: 0.1 K/UL (ref 0–0.1)
BASOPHILS NFR BLD: 2 % (ref 0–1)
BILIRUB SERPL-MCNC: 0.2 MG/DL (ref 0.2–1)
BUN SERPL-MCNC: 20 MG/DL (ref 6–20)
BUN/CREAT SERPL: 19 (ref 12–20)
CALCIUM SERPL-MCNC: 10.1 MG/DL (ref 8.5–10.1)
CHLORIDE SERPL-SCNC: 108 MMOL/L (ref 97–108)
CHOLEST SERPL-MCNC: 145 MG/DL
CO2 SERPL-SCNC: 28 MMOL/L (ref 21–32)
CREAT SERPL-MCNC: 1.08 MG/DL (ref 0.7–1.3)
CREAT UR-MCNC: 85.1 MG/DL
DIFFERENTIAL METHOD BLD: ABNORMAL
EOSINOPHIL # BLD: 0.3 K/UL (ref 0–0.4)
EOSINOPHIL NFR BLD: 4 % (ref 0–7)
ERYTHROCYTE [DISTWIDTH] IN BLOOD BY AUTOMATED COUNT: 13.4 % (ref 11.5–14.5)
GLOBULIN SER CALC-MCNC: 2.6 G/DL (ref 2–4)
GLUCOSE SERPL-MCNC: 159 MG/DL (ref 65–100)
HCT VFR BLD AUTO: 38.7 % (ref 36.6–50.3)
HDLC SERPL-MCNC: 32 MG/DL
HDLC SERPL: 4.5 (ref 0–5)
HGB BLD-MCNC: 12.6 G/DL (ref 12.1–17)
IMM GRANULOCYTES # BLD AUTO: 0 K/UL
IMM GRANULOCYTES NFR BLD AUTO: 0 %
LDLC SERPL CALC-MCNC: 72.4 MG/DL (ref 0–100)
LYMPHOCYTES # BLD: 1.4 K/UL (ref 0.8–3.5)
LYMPHOCYTES NFR BLD: 22 % (ref 12–49)
MCH RBC QN AUTO: 29.1 PG (ref 26–34)
MCHC RBC AUTO-ENTMCNC: 32.6 G/DL (ref 30–36.5)
MCV RBC AUTO: 89.4 FL (ref 80–99)
MICROALBUMIN UR-MCNC: <0.5 MG/DL
MICROALBUMIN/CREAT UR-RTO: <6 MG/G (ref 0–30)
MONOCYTES # BLD: 0.5 K/UL (ref 0–1)
MONOCYTES NFR BLD: 7 % (ref 5–13)
NEUTS SEG # BLD: 4.2 K/UL (ref 1.8–8)
NEUTS SEG NFR BLD: 65 % (ref 32–75)
NRBC # BLD: 0 K/UL (ref 0–0.01)
NRBC BLD-RTO: 0 PER 100 WBC
PLATELET # BLD AUTO: 219 K/UL (ref 150–400)
PMV BLD AUTO: 10.2 FL (ref 8.9–12.9)
POTASSIUM SERPL-SCNC: 4.4 MMOL/L (ref 3.5–5.1)
PROT SERPL-MCNC: 6.6 G/DL (ref 6.4–8.2)
PSA SERPL-MCNC: 1.3 NG/ML (ref 0.01–4)
RBC # BLD AUTO: 4.33 M/UL (ref 4.1–5.7)
RBC MORPH BLD: ABNORMAL
SODIUM SERPL-SCNC: 138 MMOL/L (ref 136–145)
T4 FREE SERPL-MCNC: 1.3 NG/DL (ref 0.8–1.5)
TRIGL SERPL-MCNC: 203 MG/DL
TSH SERPL DL<=0.05 MIU/L-ACNC: 2.68 UIU/ML (ref 0.36–3.74)
VLDLC SERPL CALC-MCNC: 40.6 MG/DL
WBC # BLD AUTO: 6.5 K/UL (ref 4.1–11.1)

## 2024-09-30 DIAGNOSIS — K21.9 GASTRO-ESOPHAGEAL REFLUX DISEASE WITHOUT ESOPHAGITIS: ICD-10-CM

## 2024-09-30 DIAGNOSIS — E11.69 TYPE 2 DIABETES MELLITUS WITH OTHER SPECIFIED COMPLICATION, WITH LONG-TERM CURRENT USE OF INSULIN (HCC): Primary | ICD-10-CM

## 2024-09-30 DIAGNOSIS — Z79.4 TYPE 2 DIABETES MELLITUS WITH OTHER SPECIFIED COMPLICATION, WITH LONG-TERM CURRENT USE OF INSULIN (HCC): Primary | ICD-10-CM

## 2024-09-30 DIAGNOSIS — E78.2 MIXED HYPERLIPIDEMIA: ICD-10-CM

## 2024-09-30 RX ORDER — SIMVASTATIN 40 MG
TABLET ORAL DAILY
Qty: 90 TABLET | Refills: 1 | Status: SHIPPED | OUTPATIENT
Start: 2024-09-30

## 2024-09-30 NOTE — TELEPHONE ENCOUNTER
PCP: Addi Corral, APRN - NP    Last appt: 9/24/2024  Future Appointments   Date Time Provider Department Center   12/4/2024  9:10 AM Ramu Hammond MD [facility] [address] BS [facility]   4/1/2025  8:00 AM Addi Corral, APRN - NP PCAM [facility] DEP       Requested Prescriptions     Pending Prescriptions Disp Refills    omeprazole (PRILOSEC) 20 MG delayed release capsule [Pharmacy Med Name: Omeprazole 20mg Delayed-Release Capsule] 90 capsule 0     Sig: Take 1 capsule by mouth once daily.    simvastatin (ZOCOR) 40 MG tablet [Pharmacy Med Name: Simvastatin 40mg Tablet] 90 tablet 0     Sig: Take 1 tablet by mouth daily.    metFORMIN (GLUCOPHAGE) 1000 MG tablet [Pharmacy Med Name: Metformin Hydrochloride 1000mg Tablet] 180 tablet 0     Sig: Take 1 tablet by mouth twice daily.       Prior labs and Blood pressures:  BP Readings from Last 3 Encounters:   09/24/24 130/74   06/19/24 131/75   05/23/24 126/74     Lab Results   Component Value Date/Time     09/25/2024 08:39 AM    K 4.4 09/25/2024 08:39 AM     09/25/2024 08:39 AM    CO2 28 09/25/2024 08:39 AM    BUN 20 09/25/2024 08:39 AM    GFRAA >60 01/24/2022 10:09 AM     Lab Results   Component Value Date/Time    [id number] 7.1 06/19/2024 09:14 AM     Lab Results   Component Value Date/Time    CHOL 145 09/25/2024 08:39 AM    HDL 32 09/25/2024 08:39 AM    LDL 72.4 09/25/2024 08:39 AM    LDL 81.2 01/03/2024 09:27 AM    VLDL 40.6 09/25/2024 08:39 AM    VLDL 48 05/24/2022 08:33 AM    VLDL 58 02/25/2021 02:02 PM     No results found for: \"VITD3\"        Lab Results   Component Value Date/Time    TSH 2.68 09/25/2024 08:39 AM

## 2024-10-18 DIAGNOSIS — E29.1 TESTICULAR HYPOFUNCTION: ICD-10-CM

## 2024-10-18 RX ORDER — TESTOSTERONE CYPIONATE 200 MG/ML
INJECTION, SOLUTION INTRAMUSCULAR
Qty: 1 ML | Refills: 2 | Status: SHIPPED | OUTPATIENT
Start: 2024-10-18 | End: 2024-11-17

## 2024-10-18 NOTE — TELEPHONE ENCOUNTER
PCP: Addi Corral, APRN - NP    Last appt: 9/24/2024    Future Appointments   Date Time Provider Department Center   12/4/2024  9:10 AM Ramu Hammond MD [facility] [address] BS [facility]   4/1/2025  8:00 AM Addi Corral, APRN - NP PCAM [facility] DEP       Requested Prescriptions     Pending Prescriptions Disp Refills    testosterone cypionate (DEPOTESTOTERONE CYPIONATE) 200 MG/ML injection [Pharmacy Med Name: *TESTOST CYP INJ 200MG/ML] 1 mL 2     Sig: PREFILLED INJECT 1ML INTO MUSCLE ONCE EVERY 30 DAYS - PREFILLED SYRINGE 22G ONE AND 1/2 INCH

## 2024-11-25 DIAGNOSIS — E11.39 TYPE 2 DIABETES MELLITUS WITH OTHER DIABETIC OPHTHALMIC COMPLICATION (HCC): ICD-10-CM

## 2024-11-25 RX ORDER — SEMAGLUTIDE 2.68 MG/ML
2 INJECTION, SOLUTION SUBCUTANEOUS
Qty: 3 ML | Refills: 3 | Status: SHIPPED | OUTPATIENT
Start: 2024-11-25

## 2024-11-29 DIAGNOSIS — E03.9 ACQUIRED HYPOTHYROIDISM: ICD-10-CM

## 2024-11-29 RX ORDER — LANCETS
EACH MISCELLANEOUS
Qty: 200 EACH | Refills: 3 | Status: SHIPPED | OUTPATIENT
Start: 2024-11-29

## 2024-11-29 RX ORDER — BLOOD SUGAR DIAGNOSTIC
STRIP MISCELLANEOUS
Qty: 200 STRIP | Refills: 3 | Status: SHIPPED | OUTPATIENT
Start: 2024-11-29

## 2024-11-29 RX ORDER — LEVOTHYROXINE SODIUM 125 UG/1
125 TABLET ORAL DAILY
Qty: 90 TABLET | Refills: 1 | Status: SHIPPED | OUTPATIENT
Start: 2024-11-29

## 2024-11-29 NOTE — TELEPHONE ENCOUNTER
PCP: Addi Corral, APRN - NP    Last appt: 9/24/2024  Future Appointments   Date Time Provider Department Center   12/4/2024  9:10 AM Ramu Hammond MD [facility] BS [facility]   4/1/2025  8:00 AM Addi Corral APRN - NP PC[facility]       Requested Prescriptions     Pending Prescriptions Disp Refills    levothyroxine (SYNTHROID) 125 MCG tablet [Pharmacy Med Name: Levothyroxine Sodium 125mcg Tablet] 90 tablet 0     Sig: Take 1 tablet by mouth daily.       Prior labs and Blood pressures:  BP Readings from Last 3 Encounters:   09/24/24 130/74   06/19/24 131/75   05/23/24 126/74     Lab Results   Component Value Date/Time     09/25/2024 08:39 AM    K 4.4 09/25/2024 08:39 AM     09/25/2024 08:39 AM    CO2 28 09/25/2024 08:39 AM    BUN 20 09/25/2024 08:39 AM    GFRAA >60 01/24/2022 10:09 AM     Lab Results   Component Value Date/Time    [id number] 7.1 06/19/2024 09:14 AM     Lab Results   Component Value Date/Time    CHOL 145 09/25/2024 08:39 AM    HDL 32 09/25/2024 08:39 AM    LDL 72.4 09/25/2024 08:39 AM    LDL 81.2 01/03/2024 09:27 AM    VLDL 40.6 09/25/2024 08:39 AM    VLDL 48 05/24/2022 08:33 AM    VLDL 58 02/25/2021 02:02 PM     No results found for: \"VITD3\"        Lab Results   Component Value Date/Time    TSH 2.68 09/25/2024 08:39 AM

## 2024-12-04 ENCOUNTER — OFFICE VISIT (OUTPATIENT)
Age: 59
End: 2024-12-04
Payer: COMMERCIAL

## 2024-12-04 VITALS
SYSTOLIC BLOOD PRESSURE: 132 MMHG | HEIGHT: 73 IN | DIASTOLIC BLOOD PRESSURE: 70 MMHG | BODY MASS INDEX: 36.71 KG/M2 | HEART RATE: 81 BPM | WEIGHT: 277 LBS

## 2024-12-04 DIAGNOSIS — E11.39 TYPE 2 DIABETES MELLITUS WITH OTHER DIABETIC OPHTHALMIC COMPLICATION (HCC): Primary | ICD-10-CM

## 2024-12-04 LAB — HBA1C MFR BLD: 6.2 %

## 2024-12-04 PROCEDURE — 83036 HEMOGLOBIN GLYCOSYLATED A1C: CPT | Performed by: INTERNAL MEDICINE

## 2024-12-04 PROCEDURE — 3075F SYST BP GE 130 - 139MM HG: CPT | Performed by: INTERNAL MEDICINE

## 2024-12-04 PROCEDURE — 99214 OFFICE O/P EST MOD 30 MIN: CPT | Performed by: INTERNAL MEDICINE

## 2024-12-04 PROCEDURE — 3078F DIAST BP <80 MM HG: CPT | Performed by: INTERNAL MEDICINE

## 2024-12-04 NOTE — PROGRESS NOTES
This is a 58-year-old white male with a history of type 2 diabetes mellitus diagnosed in 1999 and primary hypothyroidism. He has a strong family history of diabetes.  His father and his paternal grandfather have or had diabetes.  He was found to have diabetes on a routine physical.  He was placed on metformin and glipizide and over the last 20 years the regimen has been changed to add insulin.  He says his most recent A1c was 10.1%.  The diabetes is complicated by retinopathy and is currently receiving injections into the right.     At my first visit, I added Ozempic which was titrated up to 2.0 mg weekly which he continues to take.  When I saw him last his A1c was 7.1%.  His A1c today is 6.2%.     Current Diabetes Medication  Metformin 1000 BID  Glipizide 10 mg BID  70/30 insulin 30 AM and 10 units PM  Ozempic 2.0 mg weeky     He has made some changes.  Particularly a Slim fast for breakfast and/or lunch.  Dinner is about the same.  Generally has chicken and a salad.  Last night he had wonton soup and some dumplings which is more than he usually eats.  The major impact of the Ozempic has been a decreasing portion.  He is walking several times a week for about 30 minutes.  He is monitoring his blood sugars.  States that blood sugars range between 100-150.  So all in all the dietary changes and exercise changes that is made is improving his blood sugar control overall.     He denies chest pain, shortness of breath, constipation or diarrhea.  He complains of nocturia x1.    Examination  Blood pressure 132/70  Pulse 81  Weight 126 kg  BMI 36.6  HEENT unremarkable  Lungs clear  Heart reveals a regular rate and rhythm  Abdomen benign  Extremities unremarkable  Diabetic foot exam:   Left Foot:   Visual Exam: normal   Pulse DP: 2+ (normal)   Filament test: normal sensation   Vibratory Sensation: diminished  Right Foot:   Visual Exam: normal   Pulse DP: 2+ (normal)   Filament test: normal sensation   Vibratory Sensation:

## 2024-12-08 DIAGNOSIS — N40.1 BENIGN PROSTATIC HYPERPLASIA WITH URINARY HESITANCY: Primary | ICD-10-CM

## 2024-12-08 DIAGNOSIS — R39.11 BENIGN PROSTATIC HYPERPLASIA WITH URINARY HESITANCY: Primary | ICD-10-CM

## 2024-12-09 RX ORDER — TAMSULOSIN HYDROCHLORIDE 0.4 MG/1
CAPSULE ORAL
Qty: 90 CAPSULE | Refills: 1 | Status: SHIPPED | OUTPATIENT
Start: 2024-12-09

## 2024-12-31 RX ORDER — FENOFIBRATE 160 MG/1
TABLET ORAL
Qty: 90 TABLET | Refills: 1 | Status: SHIPPED | OUTPATIENT
Start: 2024-12-31

## 2024-12-31 NOTE — TELEPHONE ENCOUNTER
PCP: Addi Corral APRN - NP    Last appt: 9/24/2024    Future Appointments   Date Time Provider Department Center   4/1/2025  8:00 AM [facility]   6/2/2025  8:50 AM Ramu Hammond MD RDE [facility]       Requested Prescriptions     Pending Prescriptions Disp Refills    fenofibrate 160 MG tablet [Pharmacy Med Name: fenofibrate 160 mg tablet] 90 tablet 1     Sig: TAKE 1 TABLET EVERY DAY FOR HIGH CHOLESTEROL AND HIGH TRIGLYCERIDES

## 2025-01-28 DIAGNOSIS — I10 ESSENTIAL HYPERTENSION: ICD-10-CM

## 2025-01-28 DIAGNOSIS — E11.69 TYPE 2 DIABETES MELLITUS WITH OTHER SPECIFIED COMPLICATION, WITH LONG-TERM CURRENT USE OF INSULIN (HCC): ICD-10-CM

## 2025-01-28 DIAGNOSIS — F51.04 PSYCHOPHYSIOLOGICAL INSOMNIA: ICD-10-CM

## 2025-01-28 DIAGNOSIS — F33.1 MAJOR DEPRESSIVE DISORDER, RECURRENT, MODERATE (HCC): ICD-10-CM

## 2025-01-28 DIAGNOSIS — Z79.4 TYPE 2 DIABETES MELLITUS WITH OTHER SPECIFIED COMPLICATION, WITH LONG-TERM CURRENT USE OF INSULIN (HCC): ICD-10-CM

## 2025-01-28 RX ORDER — SERTRALINE HYDROCHLORIDE 100 MG/1
100 TABLET, FILM COATED ORAL 2 TIMES DAILY
Qty: 180 TABLET | Refills: 1 | Status: SHIPPED | OUTPATIENT
Start: 2025-01-28

## 2025-01-28 RX ORDER — SERTRALINE HYDROCHLORIDE 100 MG/1
TABLET, FILM COATED ORAL
Qty: 180 TABLET | Refills: 1 | Status: SHIPPED | OUTPATIENT
Start: 2025-01-28 | End: 2025-01-28 | Stop reason: SDUPTHER

## 2025-01-28 RX ORDER — TRAZODONE HYDROCHLORIDE 50 MG/1
50 TABLET, FILM COATED ORAL NIGHTLY
Qty: 90 TABLET | Refills: 1 | Status: SHIPPED | OUTPATIENT
Start: 2025-01-28

## 2025-01-28 RX ORDER — GLIPIZIDE 10 MG/1
TABLET ORAL
Qty: 180 TABLET | Refills: 1 | Status: SHIPPED | OUTPATIENT
Start: 2025-01-28 | End: 2025-01-28 | Stop reason: SDUPTHER

## 2025-01-28 RX ORDER — GLIPIZIDE 10 MG/1
10 TABLET ORAL 2 TIMES DAILY
Qty: 180 TABLET | Refills: 1 | Status: SHIPPED | OUTPATIENT
Start: 2025-01-28

## 2025-01-28 RX ORDER — LISINOPRIL 20 MG/1
20 TABLET ORAL DAILY
Qty: 90 TABLET | Refills: 1 | Status: SHIPPED | OUTPATIENT
Start: 2025-01-28

## 2025-01-28 RX ORDER — LISINOPRIL 20 MG/1
TABLET ORAL DAILY
Qty: 90 TABLET | Refills: 1 | Status: SHIPPED | OUTPATIENT
Start: 2025-01-28 | End: 2025-01-28 | Stop reason: SDUPTHER

## 2025-01-28 RX ORDER — TRAZODONE HYDROCHLORIDE 50 MG/1
TABLET, FILM COATED ORAL
Qty: 90 TABLET | Refills: 1 | Status: SHIPPED | OUTPATIENT
Start: 2025-01-28 | End: 2025-01-28 | Stop reason: SDUPTHER

## 2025-01-28 NOTE — TELEPHONE ENCOUNTER
RX refill request from the patient/pharmacy. Patient last seen 09/24/2024 with labs, and next appt. scheduled for 04/01/2025.      Requested Prescriptions     Pending Prescriptions Disp Refills    traZODone (DESYREL) 50 MG tablet [Pharmacy Med Name: Trazodone Hydrochloride 50mg Tablet] 90 tablet 1     Sig: Take 1 tablet by mouth at bedtime.    sertraline (ZOLOFT) 100 MG tablet [Pharmacy Med Name: Sertraline Hydrochloride 100mg Tablet] 180 tablet 1     Sig: Take 1 tablet by mouth twice daily.    lisinopril (PRINIVIL;ZESTRIL) 20 MG tablet [Pharmacy Med Name: Lisinopril 20mg Tablet] 90 tablet 1     Sig: Take 1 tablet by mouth daily.    glipiZIDE (GLUCOTROL) 10 MG tablet [Pharmacy Med Name: Glipizide 10mg Tablet] 180 tablet 1     Sig: Take 1 tablet by mouth twice daily.

## 2025-01-28 NOTE — TELEPHONE ENCOUNTER
RX refill request from the patient/pharmacy. Patient last seen 09/24/2024 with labs, and next appt. scheduled for 04/01/2025    Requested Prescriptions     Pending Prescriptions Disp Refills    traZODone (DESYREL) 50 MG tablet 90 tablet 1     Sig: Take 1 tablet by mouth nightly at bedtime.    lisinopril (PRINIVIL;ZESTRIL) 20 MG tablet 90 tablet 1     Sig: Take 1 tablet by mouth daily    glipiZIDE (GLUCOTROL) 10 MG tablet 180 tablet 1     Sig: Take 1 tablet by mouth 2 times daily    sertraline (ZOLOFT) 100 MG tablet 180 tablet 1     Sig: Take 1 tablet by mouth 2 times daily    .

## 2025-03-29 DIAGNOSIS — K21.9 GASTRO-ESOPHAGEAL REFLUX DISEASE WITHOUT ESOPHAGITIS: ICD-10-CM

## 2025-03-29 DIAGNOSIS — E11.69 TYPE 2 DIABETES MELLITUS WITH OTHER SPECIFIED COMPLICATION, WITH LONG-TERM CURRENT USE OF INSULIN: ICD-10-CM

## 2025-03-29 DIAGNOSIS — E78.2 MIXED HYPERLIPIDEMIA: ICD-10-CM

## 2025-03-29 DIAGNOSIS — Z79.4 TYPE 2 DIABETES MELLITUS WITH OTHER SPECIFIED COMPLICATION, WITH LONG-TERM CURRENT USE OF INSULIN: ICD-10-CM

## 2025-03-31 RX ORDER — OMEPRAZOLE 20 MG/1
20 CAPSULE, DELAYED RELEASE ORAL DAILY
Qty: 90 CAPSULE | Refills: 0 | Status: SHIPPED | OUTPATIENT
Start: 2025-03-31

## 2025-03-31 RX ORDER — SIMVASTATIN 40 MG
TABLET ORAL DAILY
Qty: 90 TABLET | Refills: 0 | Status: SHIPPED | OUTPATIENT
Start: 2025-03-31

## 2025-03-31 NOTE — TELEPHONE ENCOUNTER
PCP: Addi Corral, APRN - NP    Last appt: 9/24/2024    Future Appointments   Date Time Provider Department Center   6/2/2025  8:50 AM Ramu Hammond MD RDE [facility]       Requested Prescriptions     Pending Prescriptions Disp Refills    omeprazole (PRILOSEC) 20 MG delayed release capsule [Pharmacy Med Name: Omeprazole 20mg Delayed-Release Capsule] 90 capsule 0     Sig: Take 1 capsule by mouth once daily.    simvastatin (ZOCOR) 40 MG tablet [Pharmacy Med Name: Simvastatin 40mg Tablet] 90 tablet 0     Sig: Take 1 tablet by mouth daily.    metFORMIN (GLUCOPHAGE) 1000 MG tablet [Pharmacy Med Name: Metformin Hydrochloride 1000mg Tablet] 180 tablet 0     Sig: Take 1 tablet by mouth twice daily.

## 2025-04-28 RX ORDER — LANCETS
1 EACH MISCELLANEOUS 3 TIMES DAILY
Qty: 300 EACH | Refills: 2 | Status: SHIPPED | OUTPATIENT
Start: 2025-04-28

## 2025-07-09 RX ORDER — FENOFIBRATE 160 MG/1
TABLET ORAL
Qty: 90 TABLET | Refills: 0 | OUTPATIENT
Start: 2025-07-09

## 2025-08-05 DIAGNOSIS — Z79.4 TYPE 2 DIABETES MELLITUS WITH OTHER SPECIFIED COMPLICATION, WITH LONG-TERM CURRENT USE OF INSULIN (HCC): ICD-10-CM

## 2025-08-05 DIAGNOSIS — E11.69 TYPE 2 DIABETES MELLITUS WITH OTHER SPECIFIED COMPLICATION, WITH LONG-TERM CURRENT USE OF INSULIN (HCC): ICD-10-CM

## 2025-08-05 RX ORDER — GLIPIZIDE 10 MG/1
10 TABLET ORAL 2 TIMES DAILY
Qty: 360 TABLET | Refills: 0 | OUTPATIENT
Start: 2025-08-05